# Patient Record
Sex: MALE | Race: WHITE | NOT HISPANIC OR LATINO | URBAN - METROPOLITAN AREA
[De-identification: names, ages, dates, MRNs, and addresses within clinical notes are randomized per-mention and may not be internally consistent; named-entity substitution may affect disease eponyms.]

---

## 2019-04-01 ENCOUNTER — EMERGENCY (EMERGENCY)
Facility: HOSPITAL | Age: 59
LOS: 1 days | Discharge: HOME | End: 2019-04-01
Attending: EMERGENCY MEDICINE | Admitting: EMERGENCY MEDICINE
Payer: COMMERCIAL

## 2019-04-01 VITALS
HEART RATE: 78 BPM | TEMPERATURE: 99 F | SYSTOLIC BLOOD PRESSURE: 159 MMHG | RESPIRATION RATE: 20 BRPM | DIASTOLIC BLOOD PRESSURE: 82 MMHG | WEIGHT: 199.96 LBS | OXYGEN SATURATION: 99 %

## 2019-04-01 DIAGNOSIS — Z91.048 OTHER NONMEDICINAL SUBSTANCE ALLERGY STATUS: ICD-10-CM

## 2019-04-01 DIAGNOSIS — Z79.899 OTHER LONG TERM (CURRENT) DRUG THERAPY: ICD-10-CM

## 2019-04-01 DIAGNOSIS — I82.431 ACUTE EMBOLISM AND THROMBOSIS OF RIGHT POPLITEAL VEIN: ICD-10-CM

## 2019-04-01 DIAGNOSIS — B20 HUMAN IMMUNODEFICIENCY VIRUS [HIV] DISEASE: ICD-10-CM

## 2019-04-01 PROCEDURE — 99284 EMERGENCY DEPT VISIT MOD MDM: CPT

## 2019-04-01 PROCEDURE — 93971 EXTREMITY STUDY: CPT | Mod: 26,RT

## 2019-04-01 NOTE — ED PROVIDER NOTE - CLINICAL SUMMARY MEDICAL DECISION MAKING FREE TEXT BOX
+ R popliteal DVT on Eliquis. Otherwise HDS and comfortable. Recommendation for pt to be admitted to [facility] at [facility] for IV anticoagulation and evaluation by hematology. However, pt is declining admission, stating that he feels well and has things he must do this evening. He promises to report to [facility] ED tomorrow morning for medical admission. The patient wishes to leave against medical advice.  I have discussed the risks, benefits and alternatives (including the possibility of worsening of disease, pain, permanent disability, and/or death) with the patient and his/her family (if available).  The patient voices understanding of these risks, benefits, and alternatives and still wishes to sign out against medical advice.  The patient is awake, alert, oriented  x 3 and has demonstrated capacity to refuse/direct care.  I have advised the patient that they can and should return immediately should they develop any worse/different/additional symptoms, or if they change their mind and want to continue their care.

## 2019-04-01 NOTE — ED ADULT NURSE NOTE - NSIMPLEMENTINTERV_GEN_ALL_ED
Implemented All Universal Safety Interventions:  Violet to call system. Call bell, personal items and telephone within reach. Instruct patient to call for assistance. Room bathroom lighting operational. Non-slip footwear when patient is off stretcher. Physically safe environment: no spills, clutter or unnecessary equipment. Stretcher in lowest position, wheels locked, appropriate side rails in place.

## 2019-04-01 NOTE — ED ADULT TRIAGE NOTE - CHIEF COMPLAINT QUOTE
Patient with history of DVT/PE sent by his PMD for r/o DVT due to tenderness and "bump" on his right calf; taking daily eliquis; denies any chest pain, shortness of breath, trouble breathing

## 2019-04-01 NOTE — ED PROVIDER NOTE - OBJECTIVE STATEMENT
59 y/o M w/hx HIV (compliant with treatment, undetectable), + hypercoaguable disorder w/hx unprovoked DVT/PE 2yrs ago, currently on Eliquis, returned from trip to [place] last week and for past few days has had a mild swelling to the back of his R calf w/out tenderness or erythema. Seen by PMD and referred to ED, for r/o DVT. No fever/chills/trauma. No CP/SOB/palpitations or exercise intolerance.

## 2019-04-01 NOTE — ED PROVIDER NOTE - NSFOLLOWUPINSTRUCTIONS_ED_ALL_ED_FT
Log Out.    [employer]® CareNotes®     :  [facility]             DEEP VEIN THROMBOSIS - AfterCare(R) Instructions(ER/ED)     Deep Vein Thrombosis    WHAT YOU NEED TO KNOW:    Deep vein thrombosis (DVT) is a blood clot that forms in a deep vein of the body. The DVT can break into smaller pieces and travel to your lungs and cause a blockage called a pulmonary embolism. A PE can become life-threatening. It is important to go to all follow-up appointments and to take blood thinners as directed. This is especially important if you were discharged home from the emergency department.Thrombus and Embolus         DISCHARGE INSTRUCTIONS:    Call your local emergency number (911 in the [place]) if:     You feel lightheaded, short of breath, and have chest pain.      You cough up blood.    Call your doctor if:     Your arm or leg feels warm, tender, and painful. It may look swollen and red.      You have questions or concerns about your condition or care.    Medicines:     Blood thinners help prevent blood clots. Clots can cause strokes, heart attacks, and death. The following are general safety guidelines to follow while you are taking a blood thinner:  Watch for bleeding and bruising while you take blood thinners. Watch for bleeding from your gums or nose. Watch for blood in your urine and bowel movements. Use a soft washcloth on your skin, and a soft toothbrush to brush your teeth. This can keep your skin and gums from bleeding. If you shave, use an electric shaver. Do not play contact sports.       Tell your dentist and other healthcare providers that you take a blood thinner. Wear a bracelet or necklace that says you take this medicine.       Do not start or stop any other medicines unless your healthcare provider tells you to. Many medicines cannot be used with blood thinners.      Take your blood thinner exactly as prescribed by your healthcare provider. Do not skip does or take less than prescribed. Tell your provider right away if you forget to take your blood thinner, or if you take too much.      Warfarin is a blood thinner that you may need to take. The following are things you should be aware of if you take warfarin:   Foods and medicines can affect the amount of warfarin in your blood. Do not make major changes to your diet while you take warfarin. Warfarin works best when you eat about the same amount of vitamin K every day. Vitamin K is found in green leafy vegetables and certain other foods. Ask for more information about what to eat when you are taking warfarin.      You will need to see your healthcare provider for follow-up visits when you are on warfarin. You will need regular blood tests. These tests are used to decide how much medicine you need.       Take your medicine as directed. Contact your healthcare provider if you think your medicine is not helping or if you have side effects. Tell him or her if you are allergic to any medicine. Keep a list of the medicines, vitamins, and herbs you take. Include the amounts, and when and why you take them. Bring the list or the pill bottles to follow-up visits. Carry your medicine list with you in case of an emergency.    Manage a DVT:     Wear pressure stockings as directed. The stockings put pressure on your legs. This improves blood flow and helps prevent clots. Wear the stockings during the day. Do not wear them when you sleep.Pressure Stockings            Elevate your legs above the level of your heart. Elevate your legs when you sit or lie down, as often as you can. This will help decrease swelling and pain. Prop your legs on pillows or blankets to keep them elevated comfortably. Elevate Limb         Prevent a DVT:     Exercise regularly to help increase your blood flow. Walking is a good low-impact exercise. Talk to your healthcare provider about the best exercise plan for you. Walking for Exercise           Change your body position or move around often. Move and stretch in your seat several times each hour if you travel by car or work at a desk. In an airplane, get up and walk every hour. Move your legs by tightening and releasing your leg muscles while sitting. You can move your legs while sitting by raising and lowering your heels. Keep your toes on the floor while you do this. You can also raise and lower your toes while keeping your heels on the floor.DVT Prevention Heel Raise     DVT Prevention Toe Raise           Maintain a healthy weight. Ask your healthcare provider how much you should weigh. Ask him or her to help you create a weight loss plan if you are overweight.       Do not smoke. Nicotine and other chemicals in cigarettes and cigars can damage blood vessels and make it more difficult to manage your DVT. Ask your healthcare provider for information if you currently smoke and need help to quit. E-cigarettes or smokeless tobacco still contain nicotine. Talk to your healthcare provider before you use these products.      Ask about birth control if you are a woman who takes the pill. A birth control pill increases the risk for PE in certain women. The risk is higher if you are also older than 35, smoke cigarettes, or have a blood clotting disorder. Talk to your healthcare provider about other ways to prevent pregnancy, such as a cervical cap or intrauterine device (IUD).    Follow up with your doctor or specialist as directed: You may need to come in regularly for scans to check for blood clots. Your blood may checked to see how long it takes to clot. Your doctor or specialist will tell you if you need to have this test and how often to have it. Write down your questions so you remember to ask them during your visits.

## 2019-04-02 ENCOUNTER — INPATIENT (INPATIENT)
Facility: HOSPITAL | Age: 59
LOS: 0 days | Discharge: HOME | DRG: 300 | End: 2019-04-03
Attending: INTERNAL MEDICINE | Admitting: INTERNAL MEDICINE
Payer: COMMERCIAL

## 2019-04-02 VITALS
SYSTOLIC BLOOD PRESSURE: 144 MMHG | HEART RATE: 80 BPM | OXYGEN SATURATION: 96 % | WEIGHT: 199.08 LBS | DIASTOLIC BLOOD PRESSURE: 81 MMHG | RESPIRATION RATE: 20 BRPM | TEMPERATURE: 98 F | HEIGHT: 68 IN

## 2019-04-02 DIAGNOSIS — I82.431 ACUTE EMBOLISM AND THROMBOSIS OF RIGHT POPLITEAL VEIN: ICD-10-CM

## 2019-04-02 DIAGNOSIS — I10 ESSENTIAL (PRIMARY) HYPERTENSION: ICD-10-CM

## 2019-04-02 DIAGNOSIS — Z86.711 PERSONAL HISTORY OF PULMONARY EMBOLISM: ICD-10-CM

## 2019-04-02 DIAGNOSIS — R63.8 OTHER SYMPTOMS AND SIGNS CONCERNING FOOD AND FLUID INTAKE: ICD-10-CM

## 2019-04-02 DIAGNOSIS — E78.5 HYPERLIPIDEMIA, UNSPECIFIED: ICD-10-CM

## 2019-04-02 DIAGNOSIS — Z91.89 OTHER SPECIFIED PERSONAL RISK FACTORS, NOT ELSEWHERE CLASSIFIED: ICD-10-CM

## 2019-04-02 DIAGNOSIS — Z21 ASYMPTOMATIC HUMAN IMMUNODEFICIENCY VIRUS [HIV] INFECTION STATUS: ICD-10-CM

## 2019-04-02 LAB
ALBUMIN SERPL ELPH-MCNC: 4 G/DL — SIGNIFICANT CHANGE UP (ref 3.3–5)
ALP SERPL-CCNC: 36 U/L — LOW (ref 40–120)
ALT FLD-CCNC: 21 U/L — SIGNIFICANT CHANGE UP (ref 10–45)
ANION GAP SERPL CALC-SCNC: 11 MMOL/L — SIGNIFICANT CHANGE UP (ref 5–17)
APTT BLD: 31 SEC — SIGNIFICANT CHANGE UP (ref 27.5–36.3)
APTT BLD: 66.1 SEC — HIGH (ref 27.5–36.3)
AST SERPL-CCNC: 28 U/L — SIGNIFICANT CHANGE UP (ref 10–40)
BASOPHILS # BLD AUTO: 0.05 K/UL — SIGNIFICANT CHANGE UP (ref 0–0.2)
BASOPHILS NFR BLD AUTO: 0.9 % — SIGNIFICANT CHANGE UP (ref 0–2)
BILIRUB SERPL-MCNC: 0.5 MG/DL — SIGNIFICANT CHANGE UP (ref 0.2–1.2)
BUN SERPL-MCNC: 12 MG/DL — SIGNIFICANT CHANGE UP (ref 7–23)
CALCIUM SERPL-MCNC: 8.9 MG/DL — SIGNIFICANT CHANGE UP (ref 8.4–10.5)
CHLORIDE SERPL-SCNC: 102 MMOL/L — SIGNIFICANT CHANGE UP (ref 96–108)
CO2 SERPL-SCNC: 23 MMOL/L — SIGNIFICANT CHANGE UP (ref 22–31)
CREAT SERPL-MCNC: 1.05 MG/DL — SIGNIFICANT CHANGE UP (ref 0.5–1.3)
D DIMER BLD IA.RAPID-MCNC: 436 NG/ML DDU — HIGH
EOSINOPHIL # BLD AUTO: 0.06 K/UL — SIGNIFICANT CHANGE UP (ref 0–0.5)
EOSINOPHIL NFR BLD AUTO: 1 % — SIGNIFICANT CHANGE UP (ref 0–6)
GLUCOSE SERPL-MCNC: 136 MG/DL — HIGH (ref 70–99)
HCT VFR BLD CALC: 47.7 % — SIGNIFICANT CHANGE UP (ref 39–50)
HCT VFR BLD CALC: 47.7 % — SIGNIFICANT CHANGE UP (ref 39–50)
HGB BLD-MCNC: 15.7 G/DL — SIGNIFICANT CHANGE UP (ref 13–17)
HGB BLD-MCNC: 15.8 G/DL — SIGNIFICANT CHANGE UP (ref 13–17)
IMM GRANULOCYTES NFR BLD AUTO: 0.2 % — SIGNIFICANT CHANGE UP (ref 0–1.5)
INR BLD: 1.03 — SIGNIFICANT CHANGE UP (ref 0.88–1.16)
LYMPHOCYTES # BLD AUTO: 2.34 K/UL — SIGNIFICANT CHANGE UP (ref 1–3.3)
LYMPHOCYTES # BLD AUTO: 40.7 % — SIGNIFICANT CHANGE UP (ref 13–44)
MCHC RBC-ENTMCNC: 29.2 PG — SIGNIFICANT CHANGE UP (ref 27–34)
MCHC RBC-ENTMCNC: 29.5 PG — SIGNIFICANT CHANGE UP (ref 27–34)
MCHC RBC-ENTMCNC: 32.9 GM/DL — SIGNIFICANT CHANGE UP (ref 32–36)
MCHC RBC-ENTMCNC: 33.1 GM/DL — SIGNIFICANT CHANGE UP (ref 32–36)
MCV RBC AUTO: 88.7 FL — SIGNIFICANT CHANGE UP (ref 80–100)
MCV RBC AUTO: 89 FL — SIGNIFICANT CHANGE UP (ref 80–100)
MONOCYTES # BLD AUTO: 0.65 K/UL — SIGNIFICANT CHANGE UP (ref 0–0.9)
MONOCYTES NFR BLD AUTO: 11.3 % — SIGNIFICANT CHANGE UP (ref 2–14)
NEUTROPHILS # BLD AUTO: 2.64 K/UL — SIGNIFICANT CHANGE UP (ref 1.8–7.4)
NEUTROPHILS NFR BLD AUTO: 45.9 % — SIGNIFICANT CHANGE UP (ref 43–77)
NRBC # BLD: 0 /100 WBCS — SIGNIFICANT CHANGE UP (ref 0–0)
NRBC # BLD: 0 /100 WBCS — SIGNIFICANT CHANGE UP (ref 0–0)
NT-PROBNP SERPL-SCNC: 10 PG/ML — SIGNIFICANT CHANGE UP (ref 0–300)
PLATELET # BLD AUTO: 204 K/UL — SIGNIFICANT CHANGE UP (ref 150–400)
PLATELET # BLD AUTO: 212 K/UL — SIGNIFICANT CHANGE UP (ref 150–400)
POTASSIUM SERPL-MCNC: 4.2 MMOL/L — SIGNIFICANT CHANGE UP (ref 3.5–5.3)
POTASSIUM SERPL-SCNC: 4.2 MMOL/L — SIGNIFICANT CHANGE UP (ref 3.5–5.3)
PROT SERPL-MCNC: 7.6 G/DL — SIGNIFICANT CHANGE UP (ref 6–8.3)
PROTHROM AB SERPL-ACNC: 11.7 SEC — SIGNIFICANT CHANGE UP (ref 10–12.9)
RBC # BLD: 5.36 M/UL — SIGNIFICANT CHANGE UP (ref 4.2–5.8)
RBC # BLD: 5.38 M/UL — SIGNIFICANT CHANGE UP (ref 4.2–5.8)
RBC # FLD: 15.9 % — HIGH (ref 10.3–14.5)
RBC # FLD: 15.9 % — HIGH (ref 10.3–14.5)
SODIUM SERPL-SCNC: 136 MMOL/L — SIGNIFICANT CHANGE UP (ref 135–145)
TROPONIN T SERPL-MCNC: <0.01 NG/ML — SIGNIFICANT CHANGE UP (ref 0–0.01)
WBC # BLD: 5.75 K/UL — SIGNIFICANT CHANGE UP (ref 3.8–10.5)
WBC # BLD: 6.88 K/UL — SIGNIFICANT CHANGE UP (ref 3.8–10.5)
WBC # FLD AUTO: 5.75 K/UL — SIGNIFICANT CHANGE UP (ref 3.8–10.5)
WBC # FLD AUTO: 6.88 K/UL — SIGNIFICANT CHANGE UP (ref 3.8–10.5)

## 2019-04-02 PROCEDURE — 99223 1ST HOSP IP/OBS HIGH 75: CPT | Mod: GC

## 2019-04-02 PROCEDURE — 99285 EMERGENCY DEPT VISIT HI MDM: CPT | Mod: 25

## 2019-04-02 RX ORDER — HEPARIN SODIUM 5000 [USP'U]/ML
INJECTION INTRAVENOUS; SUBCUTANEOUS
Qty: 25000 | Refills: 0 | Status: DISCONTINUED | OUTPATIENT
Start: 2019-04-02 | End: 2019-04-02

## 2019-04-02 RX ORDER — BICTEGRAVIR SODIUM, EMTRICITABINE, AND TENOFOVIR ALAFENAMIDE FUMARATE 30; 120; 15 MG/1; MG/1; MG/1
1 TABLET ORAL DAILY
Qty: 0 | Refills: 0 | Status: DISCONTINUED | OUTPATIENT
Start: 2019-04-02 | End: 2019-04-03

## 2019-04-02 RX ORDER — ATORVASTATIN CALCIUM 80 MG/1
40 TABLET, FILM COATED ORAL AT BEDTIME
Qty: 0 | Refills: 0 | Status: DISCONTINUED | OUTPATIENT
Start: 2019-04-02 | End: 2019-04-03

## 2019-04-02 RX ORDER — LOSARTAN POTASSIUM 100 MG/1
25 TABLET, FILM COATED ORAL DAILY
Qty: 0 | Refills: 0 | Status: DISCONTINUED | OUTPATIENT
Start: 2019-04-02 | End: 2019-04-03

## 2019-04-02 RX ORDER — ENOXAPARIN SODIUM 100 MG/ML
90 INJECTION SUBCUTANEOUS EVERY 12 HOURS
Qty: 0 | Refills: 0 | Status: DISCONTINUED | OUTPATIENT
Start: 2019-04-02 | End: 2019-04-03

## 2019-04-02 RX ORDER — HEPARIN SODIUM 5000 [USP'U]/ML
7000 INJECTION INTRAVENOUS; SUBCUTANEOUS ONCE
Qty: 0 | Refills: 0 | Status: COMPLETED | OUTPATIENT
Start: 2019-04-02 | End: 2019-04-02

## 2019-04-02 RX ORDER — METOPROLOL TARTRATE 50 MG
25 TABLET ORAL
Qty: 0 | Refills: 0 | Status: DISCONTINUED | OUTPATIENT
Start: 2019-04-02 | End: 2019-04-03

## 2019-04-02 RX ORDER — HEPARIN SODIUM 5000 [USP'U]/ML
7000 INJECTION INTRAVENOUS; SUBCUTANEOUS EVERY 6 HOURS
Qty: 0 | Refills: 0 | Status: DISCONTINUED | OUTPATIENT
Start: 2019-04-02 | End: 2019-04-02

## 2019-04-02 RX ORDER — HEPARIN SODIUM 5000 [USP'U]/ML
3500 INJECTION INTRAVENOUS; SUBCUTANEOUS EVERY 6 HOURS
Qty: 0 | Refills: 0 | Status: DISCONTINUED | OUTPATIENT
Start: 2019-04-02 | End: 2019-04-02

## 2019-04-02 RX ORDER — ACETAMINOPHEN 500 MG
650 TABLET ORAL EVERY 6 HOURS
Qty: 0 | Refills: 0 | Status: DISCONTINUED | OUTPATIENT
Start: 2019-04-02 | End: 2019-04-03

## 2019-04-02 RX ADMIN — HEPARIN SODIUM 1600 UNIT(S)/HR: 5000 INJECTION INTRAVENOUS; SUBCUTANEOUS at 09:46

## 2019-04-02 RX ADMIN — ATORVASTATIN CALCIUM 40 MILLIGRAM(S): 80 TABLET, FILM COATED ORAL at 22:18

## 2019-04-02 RX ADMIN — BICTEGRAVIR SODIUM, EMTRICITABINE, AND TENOFOVIR ALAFENAMIDE FUMARATE 1 TABLET(S): 30; 120; 15 TABLET ORAL at 13:11

## 2019-04-02 RX ADMIN — LOSARTAN POTASSIUM 25 MILLIGRAM(S): 100 TABLET, FILM COATED ORAL at 13:12

## 2019-04-02 RX ADMIN — ENOXAPARIN SODIUM 90 MILLIGRAM(S): 100 INJECTION SUBCUTANEOUS at 23:33

## 2019-04-02 RX ADMIN — HEPARIN SODIUM 7000 UNIT(S): 5000 INJECTION INTRAVENOUS; SUBCUTANEOUS at 09:48

## 2019-04-02 NOTE — H&P ADULT - NSHPPHYSICALEXAM_GEN_ALL_CORE
VITAL SIGNS:  T(C): 37 (04-02-19 @ 09:01), Max: 37.1 (04-01-19 @ 17:21)  T(F): 98.6 (04-02-19 @ 09:01), Max: 98.8 (04-01-19 @ 17:21)  HR: 65 (04-02-19 @ 09:01) (65 - 80)  BP: 153/75 (04-02-19 @ 09:01) (144/81 - 159/82)  BP(mean): --  RR: 18 (04-02-19 @ 09:01) (18 - 20)  SpO2: 99% (04-02-19 @ 09:01) (96% - 99%)  Wt(kg): --    PHYSICAL EXAM:  Constitutional: WDWN resting comfortably in bed; NAD  Head: NC/AT  Eyes: PERRL, EOMI, anicteric sclera  ENT: no nasal discharge; uvula midline, no oropharyngeal erythema or exudates; MMM  Neck: supple; no JVD or thyromegaly  Respiratory: CTA B/L; no W/R/R, no retractions  Cardiac: +S1/S2; RRR; no M/R/G; PMI non-displaced  Gastrointestinal: soft, NT/ND; no rebound or guarding; +BSx4  Back: spine midline, no bony tenderness or step-offs; no CVAT B/L  Extremities: WWP, no clubbing or cyanosis; R lower leg with nonpitting edema, larger than left, no erythema or warmth, slight tenderness under the R knee, otherwise nontender. L leg normal. DP/PT pulses normal bilaterally.   Musculoskeletal: NROM x4; no joint swelling, tenderness or erythema  Dermatologic: skin warm, dry and intact; no rashes, wounds, or scars  Neurologic: AAOx3; CNII-XII grossly intact; no focal deficits  Psychiatric: affect and characteristics of appearance, verbalizations, behaviors are appropriate

## 2019-04-02 NOTE — ED PROVIDER NOTE - ATTENDING CONTRIBUTION TO CARE
Pt is  a 59yo m, h/o hiv on haart (vl ud), dvt/pe on eliquis, who p/w new dvt to rle. Pt returned from [place] 1 wk ago and noted pain and swelling to r calf. Seen at [facility] last night and found to have dvt to r greater saph/ popliteal region. No cp, sob.     VITAL SIGNS: I have reviewed nursing notes and confirm.  CONSTITUTIONAL: Well-developed; well-nourished; in no acute distress.   SKIN:  warm and dry, no acute rash.   HEAD:  normocephalic, atraumatic.  EYES: PERRL, EOM intact; conjunctiva and sclera clear.  ENT: No nasal discharge; airway clear.   NECK: Supple; non tender.  CARD: S1, S2 normal; no murmurs, gallops, or rubs. Regular rate and rhythm.   RESP:  Clear to auscultation b/l, no wheezes, rales or rhonchi.  ABD: Normal bowel sounds; soft; non-distended; non-tender; no guarding/ rebound.  EXT: Normal ROM. R calf: + increased girth and swelling. Distal pulses intact. Compartments soft.   NEURO: Alert, oriented, motor/ sensation intact.  PSYCH: Cooperative, mood and affect appropriate.    Labs pending. Will start on heparin and admit for recurrent dvt despite being on ac. Pt is  a 59yo m, h/o hiv on haart (vl ud), dvt/pe on eliquis, hypercoagulable d/o, who p/w new dvt to rle. Pt returned from [place] 1 wk ago and noted pain and swelling to r calf. Seen at [facility] last night and found to have dvt to r greater saph/ popliteal region. No cp, sob, palp, dizziness, syncope...    VITAL SIGNS: I have reviewed nursing notes and confirm.  CONSTITUTIONAL: Well-developed; well-nourished; in no acute distress.   SKIN:  warm and dry, no acute rash.   HEAD:  normocephalic, atraumatic.  EYES: PERRL, EOM intact; conjunctiva and sclera clear.  ENT: No nasal discharge; airway clear.   NECK: Supple; non tender.  CARD: S1, S2 normal; no murmurs, gallops, or rubs. Regular rate and rhythm.   RESP:  Clear to auscultation b/l, no wheezes, rales or rhonchi.  ABD: Normal bowel sounds; soft; non-distended; non-tender; no guarding/ rebound.  EXT: Normal ROM. R calf: + mildly increased girth and swelling. Distal pulses intact. Compartments soft.   NEURO: Alert, oriented, motor/ sensation intact.  PSYCH: Cooperative, mood and affect appropriate.    Labs pending. Will start on heparin and admit for recurrent dvt despite being on ac.

## 2019-04-02 NOTE — H&P ADULT - ASSESSMENT
Patient is a 59 y/o male w hx of HIV (VLUD, well controlled on ARVs), DVT/PE 2 years ago (on Eliquis), prothrombin gene mutation, HTN, HLD, TAA presenting for evaluation of R calf pain.

## 2019-04-02 NOTE — H&P ADULT - ATTENDING COMMENTS
Pt seen and examined by me with housestaff earlier. Agree with housestaff's exam/a/p as noted above with additions,   VSS, exam as above  labs reviewed   a/p;  1. Recurrent DVT despite compliant with eliquis-appreciate heme recs, on heparin gtt  2. HIV-c/w home meds  rest of treatment plan as above.

## 2019-04-02 NOTE — ED PROVIDER NOTE - PHYSICAL EXAMINATION
CONSTITUTIONAL: Well-appearing; well-nourished; in no apparent distress.   HEAD: Normocephalic; atraumatic.   EYES: PERRL; EOM intact; conjunctiva and sclera clear  ENT: normal nose; no rhinorrhea; normal pharynx with no erythema or lesions.   NECK: Supple; non-tender; no LAD  CARDIOVASCULAR: Normal S1, S2; no murmurs, rubs, or gallops. Regular rate and rhythm.   RESPIRATORY: Breathing easily; breath sounds clear and equal bilaterally; no wheezes, rhonchi, or rales.  GI: Soft; non-distended; non-tender; no palpable organomegaly.   MSK: FROM at all extremities, normal tone   EXT: No cyanosis or edema; N/V intact  SKIN: Normal for age and race; warm; dry; good turgor; no apparent lesions or rash.   NEURO: A & O x 3; face symmetric; grossly unremarkable.   PSYCHOLOGICAL: The patient’s mood and manner are appropriate. CONSTITUTIONAL: Well-appearing; well-nourished; in no apparent distress.   HEAD: Normocephalic; atraumatic.   EYES: PERRL; EOM intact; conjunctiva and sclera clear  ENT: normal nose; no rhinorrhea; normal pharynx with no erythema or lesions.   NECK: Supple; non-tender; no LAD  CARDIOVASCULAR: Normal S1, S2; no murmurs, rubs, or gallops. Regular rate and rhythm.   RESPIRATORY: Breathing easily; breath sounds clear and equal bilaterally; no wheezes, rhonchi, or rales.  GI: Soft; non-distended; non-tender; no palpable organomegaly.   MSK: FROM at all extremities, normal tone   EXT: No cyanosis; mild R calf swelling with tenderness, NV intact   SKIN: Normal for age and race; warm; dry; good turgor; no apparent lesions or rash.   NEURO: A & O x 3; face symmetric; grossly unremarkable.   PSYCHOLOGICAL: The patient’s mood and manner are appropriate.

## 2019-04-02 NOTE — H&P ADULT - NSHPLABSRESULTS_GEN_ALL_CORE
LABS:                        15.8   5.75  )-----------( 204      ( 02 Apr 2019 09:02 )             47.7     04-02    136  |  102  |  12  ----------------------------<  136<H>  4.2   |  23  |  1.05    Ca    8.9      02 Apr 2019 09:02    TPro  7.6  /  Alb  4.0  /  TBili  0.5  /  DBili  x   /  AST  28  /  ALT  21  /  AlkPhos  36<L>  04-02    PT/INR - ( 02 Apr 2019 09:02 )   PT: 11.7 sec;   INR: 1.03          PTT - ( 02 Apr 2019 09:02 )  PTT:31.0 sec    CAPILLARY BLOOD GLUCOSE          RADIOLOGY & ADDITIONAL TESTS: Reviewed.]  US LE with popliteal and proximal greater saphenous nonocclusive thrombus

## 2019-04-02 NOTE — H&P ADULT - NSICDXPASTMEDICALHX_GEN_ALL_CORE_FT
PAST MEDICAL HISTORY:  DVT (deep venous thrombosis)     HIV (human immunodeficiency virus infection)     Pulmonary emboli

## 2019-04-02 NOTE — H&P ADULT - PROBLEM SELECTOR PLAN 3
Known HIV infection, compliant on meds, long history of undetectable VL, follows up with an HIV clinic near his home in [place]  - Continue home Biktarvy and Kaletra

## 2019-04-02 NOTE — H&P ADULT - PROBLEM SELECTOR PLAN 2
Wells score is 4.5 given previous DVT/PE and confirmed DVT, however patient already on heparin drip and has no pulmonary symptoms or tachycardia, no signs of hemodynamic instability  - CTPE unlikely to  at this time, will obtain if patient develops pulmonary symptoms or tachycardia

## 2019-04-02 NOTE — H&P ADULT - NSHPREVIEWOFSYSTEMS_GEN_ALL_CORE
REVIEW OF SYSTEMS:  CONSTITUTIONAL: No weakness, fevers or chills.   EYES/ENT: No visual changes;  No vertigo or throat pain   NECK: No pain or stiffness  RESPIRATORY: No cough, wheezing, hemoptysis; No shortness of breath  CARDIOVASCULAR: No chest pain or palpitations. R calf pain and swelling.   GASTROINTESTINAL: No abdominal or epigastric pain. No nausea, vomiting, or hematemesis; No diarrhea or constipation. No melena or hematochezia.  GENITOURINARY: No dysuria, frequency or hematuria  NEUROLOGICAL: No numbness or weakness  SKIN: No itching, burning, rashes, or lesions   All other review of systems is negative unless indicated above.

## 2019-04-02 NOTE — H&P ADULT - PROBLEM SELECTOR PLAN 1
Patient with new nonocclusive R proximal greater saphenous and R popliteal DVT seen on US doppler. Hx of clotting disorder, on Eliquis and compliant, symptoms began prior to flight, therefore unprovoked DVT, failed Eliquis.  - Started heparin drip, patient was bolused in ED, PTT q6h and will continue to adjust dosage  - Patient has never been on anticoagulants other than Eliquis, last and only episode of DVT/PE was 2 years ago Patient with new nonocclusive R proximal greater saphenous and R popliteal DVT seen on US doppler. Hx of clotting disorder, on Eliquis 2.5 bid and compliant, symptoms began prior to flight, therefore unprovoked DVT  - Eliquis 2.5 bid dosing is likely d/t interaction with Kaletra which rec's 50% dose reduction. Consulted heme/onc for recommendations on alternative dosing/medication given treatment failure  - Started heparin drip, patient was bolused in ED, PTT q6h and will continue to adjust dosage  - Patient has never been on anticoagulants other than Eliquis, last and only episode of DVT/PE was 2 years ago

## 2019-04-02 NOTE — H&P ADULT - PROBLEM SELECTOR PLAN 7
1.       PCP Contacted on Admission: (Y/N) --> Name & Phone #: Duglas Mane (LEON) at [facility]  2.       Date of Contact with PCP: 4/2/19  3.       PCP Contacted at Discharge: (Y/N)  4.       Summary of Handoff Given to PCP:  5.       Post-Discharge Appointment Date and Location: [facility]

## 2019-04-02 NOTE — CONSULT NOTE ADULT - ASSESSMENT
Patient is a 57 y/o male w hx of HIV (VLUD, well controlled on ARVs), DVT/PE 2 years ago (on Eliquis), prothrombin gene mutation, HTN, HLD, TAA presenting for evaluation of R calf pain.     #R DVT   #Prothrombin Gene mutation     -c/w hep gtt, no clinical cardiopulm signs for PE  -appears to have recurrent/acute unprovoked thrombosis  -previous Apixaban dosing 2.5 mg BID (due to interaction with HAART), will consider alternative NOAC vs. Lovenox (no signs of organ dysfunct)   -lifetime risk of recurrent thrombosis remains elevated   -to limit high risk states including obesity, immobility smoking       To be discussed w/ Dr. Zeng Patient is a 59 y/o male w hx of HIV dx 1989, MSM (VLUD, well controlled on ARVs), DVT/PE 2 years ago (on Eliquis), prothrombin gene mutation, HTN, HLD, TAA presenting for evaluation of R calf pain, with long hx of frequent air travel.     #R DVT, Recurrent   #Prothrombin Gene mutation (hetero/homozygous?)    -c/w hep gtt, no clinical cardiopulm signs for PE  -appears to have recurrent/acute unprovoked thrombosis  -pt admits to compliance with medications   -previous Apixaban dosing 2.5 mg BID (due to interaction with HAART), will consider alternative NOAC vs. Lovenox (no signs of organ dysfunct)   -lifetime risk of recurrent thrombosis remains elevated   -to limit high risk states including obesity, immobility smoking       To be discussed w/ Dr. Zeng Patient is a 57 y/o male w hx of HIV dx 1990, MSM (VLUD, well controlled on ARVs), DVT/PE 2 years ago (on Eliquis), prothrombin gene mutation, HTN, HLD, TAA presenting for evaluation of R calf pain, with long hx of frequent air travel.     #R DVT, Recurrent, Superficial Thrombophlebitis   #Prothrombin Gene mutation (hetero/homozygous?)    -transition to Lovenox 90 mg BID, monitor for bleeding  -Please send Prothrombin gene mutation, Anticardiolipin, and B2 glycoprotein testing   -please send D-dimer  (acute vs. chronic?), will discuss w/ radiology   -ideally would have sent for factor Xa level to core lab for drug level monitoring, however outside the window   -pt admits to compliance with medications, will re-address  -please discuss w/ ID if another ARV can be substituted to allow for another NOAC or Oral agent  -lifetime risk of recurrent thrombosis remains elevated   -to limit high risk states including obesity, immobility smoking       Discussed w/ Dr. Zeng

## 2019-04-02 NOTE — H&P ADULT - HISTORY OF PRESENT ILLNESS
Patient is a 59 y/o male w hx of HIV (VLUD, well controlled on ARVs), DVT/PE 2 years ago (on Eliquis), prothrombin gene mutation, HTN, HLD, TAA presenting for evaluation of R calf pain. Patient states that 1 week ago he was working out when he began to notice pain and swelling in his left calf. 5 days ago he took a flight from [place] to [place]. He continued to have worsening pain and swelling and was sent to the ED by his primary for an ultrasound. In the ED patient was found to have nonocclusive thrombus of the R popliteal and R proximal greater saphenous. Patient has been taking Eliquis regularly for the past two years and has not had any blood clots since then. He denies any shortness of breath, cough/hemoptysis, orthopnea, dizziness, lightheadedness, fevers, palpitations, chest pain, or abdominal pain. In the ED patient received a heparin bolus and was started on heparin drip. Patient is a 57 y/o male w hx of HIV (VLUD, well controlled on ARVs), DVT/PE 2 years ago (on Eliquis), prothrombin gene mutation, HTN, HLD, TAA presenting for evaluation of R calf pain. Patient states that 1 week ago he was working out when he began to notice pain and swelling in his left calf. 5 days ago he took a flight from [place] to [place]. He continued to have worsening pain and swelling and was sent to the ED by his primary for an ultrasound. In the ED patient was found to have nonocclusive thrombus of the R popliteal and R proximal greater saphenous. Patient has been taking Eliquis 2.5 bid regularly for the past two years and has not had any blood clots since then. He denies any shortness of breath, cough/hemoptysis, orthopnea, dizziness, lightheadedness, fevers, palpitations, chest pain, or abdominal pain. In the ED patient received a heparin bolus and was started on heparin drip.

## 2019-04-02 NOTE — CONSULT NOTE ADULT - SUBJECTIVE AND OBJECTIVE BOX
Hematology Oncology Consult Note (Dr. Zeng )    Patient is a 59 y/o male w hx of HIV (VLUD, well controlled on ARVs), DVT/PE 2 years ago (on Eliquis), prothrombin gene mutation, HTN, HLD, TAA presenting for evaluation of R calf pain. Patient states that 1 week ago he was working out when he began to notice pain and swelling in his left calf. 5 days ago he took a flight from [place] to [place]. He continued to have worsening pain and swelling and was sent to the ED by his primary for an ultrasound. In the ED patient was found to have nonocclusive thrombus of the R popliteal and R proximal greater saphenous. Patient has been taking Eliquis regularly for the past two years and has not had any blood clots since then. He denies any shortness of breath, cough/hemoptysis, orthopnea, dizziness, lightheadedness, fevers, palpitations, chest pain, or abdominal pain. In the ED patient received a heparin bolus and was started on heparin drip.       PAST MEDICAL & SURGICAL HISTORY:  Pulmonary emboli  DVT (deep venous thrombosis)  HIV (human immunodeficiency virus infection)  No significant past surgical history      Allergies:  NKDA         Medications:  heparin  Infusion.  Unit(s)/Hr IV Continuous <Continuous>  heparin  Injectable 7000 Unit(s) IV Push every 6 hours PRN  heparin  Injectable 3500 Unit(s) IV Push every 6 hours PRN  MEDICATIONS  (STANDING):  atorvastatin 40 milliGRAM(s) Oral at bedtime  bictegravir 50 mG/emtricitabine 200 mG/tenofovir alafenamide 25 mG (BIKTARVY) 1 Tablet(s) Oral daily  heparin  Infusion.  Unit(s)/Hr (16 mL/Hr) IV Continuous <Continuous>  lopinavir 200 mG/ritonavir 50 mG 2 Tablet(s) Oral two times a day  losartan 25 milliGRAM(s) Oral daily  metoprolol succinate ER 25 milliGRAM(s) Oral two times a day    MEDICATIONS  (PRN):  acetaminophen   Tablet .. 650 milliGRAM(s) Oral every 6 hours PRN Mild Pain (1 - 3)  heparin  Injectable 7000 Unit(s) IV Push every 6 hours PRN For aPTT less than 40  heparin  Injectable 3500 Unit(s) IV Push every 6 hours PRN For aPTT between 40 - 57        Social History:   Tobacco: Denies  	EtOH: 2 drinks 4x week  Drugs: Denies    FAMILY HISTORY:  Family history of CVA  FH: myocardial infarction      PHYSICAL EXAM:    T(F): 98.6 (04-02-19 @ 09:01), Max: 98.8 (04-01-19 @ 17:21)  HR: 65 (04-02-19 @ 09:01) (65 - 80)  BP: 153/75 (04-02-19 @ 09:01) (144/81 - 159/82)  RR: 18 (04-02-19 @ 09:01) (18 - 20)  SpO2: 99% (04-02-19 @ 09:01) (96% - 99%)  Wt(kg): --    Daily Height in cm: 172.72 (02 Apr 2019 07:10)    Daily     GEN: NAD  HEENT: AT/NC, EOMI, MMM  NECK: supple   CVS: +S1S2 RRR   LUNG: CTA B/L   GI: +BS, NT ND  EXT: R > L w/ slight tenderness under knee   NEURO: aaox3             Labs:                          15.8   5.75  )-----------( 204      ( 02 Apr 2019 09:02 )             47.7     CBC Full  -  ( 02 Apr 2019 09:02 )  WBC Count : 5.75 K/uL  RBC Count : 5.36 M/uL  Hemoglobin : 15.8 g/dL  Hematocrit : 47.7 %  Platelet Count - Automated : 204 K/uL  Mean Cell Volume : 89.0 fl  Mean Cell Hemoglobin : 29.5 pg  Mean Cell Hemoglobin Concentration : 33.1 gm/dL  Auto Neutrophil # : 2.64 K/uL  Auto Lymphocyte # : 2.34 K/uL  Auto Monocyte # : 0.65 K/uL  Auto Eosinophil # : 0.06 K/uL  Auto Basophil # : 0.05 K/uL  Auto Neutrophil % : 45.9 %  Auto Lymphocyte % : 40.7 %  Auto Monocyte % : 11.3 %  Auto Eosinophil % : 1.0 %  Auto Basophil % : 0.9 %    PT/INR - ( 02 Apr 2019 09:02 )   PT: 11.7 sec;   INR: 1.03          PTT - ( 02 Apr 2019 09:02 )  PTT:31.0 sec    04-02    136  |  102  |  12  ----------------------------<  136<H>  4.2   |  23  |  1.05    Ca    8.9      02 Apr 2019 09:02    TPro  7.6  /  Alb  4.0  /  TBili  0.5  /  DBili  x   /  AST  28  /  ALT  21  /  AlkPhos  36<L>  04-02      Ultrasound 4/2/19    IMPRESSION:    Nonocclusive thrombus in the right popliteal vein.  Nonocclusive thrombus in the proximal right greater saphenous vein. Hematology Oncology Consult Note (Dr. Zeng )    Patient is a 59 y/o male w hx of HIV ( Dx 1989 MSM, no opportunistic infect, VLUD, well controlled on ARVs), DVT/PE 2 years ago (on Eliquis), prothrombin gene mutation (unclear if hetero/homozygous), HTN, HLD, TAA presenting for evaluation of R calf pain. Patient states that 1 week ago he was working out when he began to notice pain and swelling in his R calf. 5 days ago he took a flight from [place] to [place]. Patient travels frequently every 2 weeks [place] to [place]. He continued to have worsening pain and swelling and was sent to the ED by his primary for an ultrasound. In the ED patient was found to have nonocclusive thrombus of the R popliteal and R proximal greater saphenous. Patient has been taking Eliquis regularly for the past two years and has not had any blood clots since then. He denies any shortness of breath, cough/hemoptysis, orthopnea, dizziness, lightheadedness, fevers, palpitations, chest pain, or abdominal pain. In the ED patient received a heparin bolus and was started on heparin drip.       PAST MEDICAL & SURGICAL HISTORY:  Pulmonary emboli  DVT (deep venous thrombosis)  HIV (human immunodeficiency virus infection)  No significant past surgical history      Allergies:  NKDA         Medications:  heparin  Infusion.  Unit(s)/Hr IV Continuous <Continuous>  heparin  Injectable 7000 Unit(s) IV Push every 6 hours PRN  heparin  Injectable 3500 Unit(s) IV Push every 6 hours PRN  MEDICATIONS  (STANDING):  atorvastatin 40 milliGRAM(s) Oral at bedtime  bictegravir 50 mG/emtricitabine 200 mG/tenofovir alafenamide 25 mG (BIKTARVY) 1 Tablet(s) Oral daily  heparin  Infusion.  Unit(s)/Hr (16 mL/Hr) IV Continuous <Continuous>  lopinavir 200 mG/ritonavir 50 mG 2 Tablet(s) Oral two times a day  losartan 25 milliGRAM(s) Oral daily  metoprolol succinate ER 25 milliGRAM(s) Oral two times a day    MEDICATIONS  (PRN):  acetaminophen   Tablet .. 650 milliGRAM(s) Oral every 6 hours PRN Mild Pain (1 - 3)  heparin  Injectable 7000 Unit(s) IV Push every 6 hours PRN For aPTT less than 40  heparin  Injectable 3500 Unit(s) IV Push every 6 hours PRN For aPTT between 40 - 57        Social History:   Tobacco: Denies  	EtOH: 2 drinks 4x week  Drugs: Denies    FAMILY HISTORY:  Family history of CVA  FH: myocardial infarction      PHYSICAL EXAM:    T(F): 98.6 (04-02-19 @ 09:01), Max: 98.8 (04-01-19 @ 17:21)  HR: 65 (04-02-19 @ 09:01) (65 - 80)  BP: 153/75 (04-02-19 @ 09:01) (144/81 - 159/82)  RR: 18 (04-02-19 @ 09:01) (18 - 20)  SpO2: 99% (04-02-19 @ 09:01) (96% - 99%)  Wt(kg): --    Daily Height in cm: 172.72 (02 Apr 2019 07:10)    Daily     GEN: NAD  HEENT: AT/NC, EOMI, MMM  NECK: supple   CVS: +S1S2 RRR   LUNG: CTA B/L   GI: +BS, NT ND  EXT: R > L w/ slight tenderness under knee   NEURO: aaox3       Labs:                          15.8   5.75  )-----------( 204      ( 02 Apr 2019 09:02 )             47.7     CBC Full  -  ( 02 Apr 2019 09:02 )  WBC Count : 5.75 K/uL  RBC Count : 5.36 M/uL  Hemoglobin : 15.8 g/dL  Hematocrit : 47.7 %  Platelet Count - Automated : 204 K/uL  Mean Cell Volume : 89.0 fl  Mean Cell Hemoglobin : 29.5 pg  Mean Cell Hemoglobin Concentration : 33.1 gm/dL  Auto Neutrophil # : 2.64 K/uL  Auto Lymphocyte # : 2.34 K/uL  Auto Monocyte # : 0.65 K/uL  Auto Eosinophil # : 0.06 K/uL  Auto Basophil # : 0.05 K/uL  Auto Neutrophil % : 45.9 %  Auto Lymphocyte % : 40.7 %  Auto Monocyte % : 11.3 %  Auto Eosinophil % : 1.0 %  Auto Basophil % : 0.9 %    PT/INR - ( 02 Apr 2019 09:02 )   PT: 11.7 sec;   INR: 1.03          PTT - ( 02 Apr 2019 09:02 )  PTT:31.0 sec    04-02    136  |  102  |  12  ----------------------------<  136<H>  4.2   |  23  |  1.05    Ca    8.9      02 Apr 2019 09:02    TPro  7.6  /  Alb  4.0  /  TBili  0.5  /  DBili  x   /  AST  28  /  ALT  21  /  AlkPhos  36<L>  04-02      Ultrasound 4/2/19    IMPRESSION:    Nonocclusive thrombus in the right popliteal vein.  Nonocclusive thrombus in the proximal right greater saphenous vein.

## 2019-04-02 NOTE — ED ADULT TRIAGE NOTE - ARRIVAL INFO ADDITIONAL COMMENTS
Pt c/o RLE pain. Pt seen at [facility] and dx with RL DVT. Pt was to be admitted but he went home first.

## 2019-04-02 NOTE — ED ADULT NURSE NOTE - NSIMPLEMENTINTERV_GEN_ALL_ED
Implemented All Universal Safety Interventions:  [place] to call system. Call bell, personal items and telephone within reach. Instruct patient to call for assistance. Room bathroom lighting operational. Non-slip footwear when patient is off stretcher. Physically safe environment: no spills, clutter or unnecessary equipment. Stretcher in lowest position, wheels locked, appropriate side rails in place.

## 2019-04-02 NOTE — ED ADULT NURSE NOTE - EXTENSIONS OF SELF_ADULT
[facility] Emergency [facility]  [address]   [place] [address]  Phone: [phone]    Name:  Anshul Paniagua  Current Date: 2017  : 1949  MRN: [id number]   YRN: [id number]    Visit Date: 2017  Address: [place] [address]  Phone: [phone]    Primary Care Provider     Name: Outside Provider    Phone: None    The staff of [facility] would like to thank you for allowing us to assist you with your healthcare needs. The following includes patient education materials and information on how best to care for your illness/injury at home and when to see a physician. If you need to locate a Doctor or clinic close to you, please call the Doctor Referral Service at [phone]. The Service is available Monday through Thursday from 8 AM to 8 PM and  from 8 AM to 4 PM.    Patients Please Note: If further time off is required, or a medical clearance to return to work is required, it must be obtained through your primary physician.  Return to work clearances and extensions of \"Time-Off\" will not be given by the Emergency Department.     We hope that you leave our Emergency Department believing that we provided you with very good care.   Your Opinion Matters To Us  If you receive a patient satisfaction survey in the mail, please complete and return it in the postage-paid envelope.  We truly value and appreciate your feedback.  Emergency Department Care Providers   Physician:Bennie Locke MD    Advanced Practice Provider:  No providers found     RN_________________ ED Tech__________________ Clerical_________________         None

## 2019-04-02 NOTE — ED PROVIDER NOTE - OBJECTIVE STATEMENT
59 y/o M w/hx HIV (compliant with treatment, undetectable), + hypercoaguable disorder w/hx unprovoked DVT/PE 2yrs ago, currently on Eliquis, returned from trip to [place] last week and for past few days has had a mild swelling to the back of his R calf w/out tenderness or erythema. Seen by PMD and referred to ED, for r/o DVT. No fever/chills/trauma. No CP/SOB/palpitations or exercise intolerance. Pt seen at [facility] yesterday and diagnosed with a popliteal DVT. Pt recommended for admission for heparin but pt needed to go home and take care of some things first. 57 y/o M with pmh HIV (compliant with treatment, undetectable), + hypercoaguable disorder w/hx unprovoked DVT/PE 2yrs ago, currently on Eliquis, returned from trip to [place] last week and for past few days has had a mild swelling to the back of his R calf. Initially felt a hard lump and then a few days later it started to swell and have pain. Seen by PMD and referred to ED, for r/o DVT. No fever/chills/trauma. No CP/SOB/palpitations or exercise intolerance. Pt seen at [facility] yesterday and diagnosed with a popliteal DVT and greater saphenous. Pt recommended for admission for heparin but pt needed to go home and take care of some things first.

## 2019-04-02 NOTE — ED PROVIDER NOTE - CLINICAL SUMMARY MEDICAL DECISION MAKING FREE TEXT BOX
57 y/o M with pmh HIV (compliant with treatment, undetectable), + hypercoaguable disorder w/hx unprovoked DVT/PE 2yrs ago, currently on Eliquis, returned from trip to [place] last week and for past few days has had a mild swelling to the back of his R calf. Diagnosed with R popliteal and greater saphenous DVT yesterday. No cp, sob. Here for admission for heparin.

## 2019-04-02 NOTE — ED ADULT NURSE NOTE - CHPI ED NUR SYMPTOMS NEG
no nausea/no dizziness/no tingling/no vomiting/no weakness/no pain/no decreased eating/drinking/no chills/no fever

## 2019-04-03 ENCOUNTER — TRANSCRIPTION ENCOUNTER (OUTPATIENT)
Age: 59
End: 2019-04-03

## 2019-04-03 VITALS
SYSTOLIC BLOOD PRESSURE: 145 MMHG | TEMPERATURE: 98 F | HEART RATE: 67 BPM | DIASTOLIC BLOOD PRESSURE: 79 MMHG | RESPIRATION RATE: 18 BRPM | OXYGEN SATURATION: 96 %

## 2019-04-03 LAB
ANION GAP SERPL CALC-SCNC: 9 MMOL/L — SIGNIFICANT CHANGE UP (ref 5–17)
APTT BLD: 58.6 SEC — HIGH (ref 27.5–36.3)
BUN SERPL-MCNC: 13 MG/DL — SIGNIFICANT CHANGE UP (ref 7–23)
CALCIUM SERPL-MCNC: 8.8 MG/DL — SIGNIFICANT CHANGE UP (ref 8.4–10.5)
CHLORIDE SERPL-SCNC: 100 MMOL/L — SIGNIFICANT CHANGE UP (ref 96–108)
CO2 SERPL-SCNC: 25 MMOL/L — SIGNIFICANT CHANGE UP (ref 22–31)
CREAT SERPL-MCNC: 1.23 MG/DL — SIGNIFICANT CHANGE UP (ref 0.5–1.3)
GLUCOSE SERPL-MCNC: 98 MG/DL — SIGNIFICANT CHANGE UP (ref 70–99)
HCT VFR BLD CALC: 47.5 % — SIGNIFICANT CHANGE UP (ref 39–50)
HGB BLD-MCNC: 15.7 G/DL — SIGNIFICANT CHANGE UP (ref 13–17)
MAGNESIUM SERPL-MCNC: 1.9 MG/DL — SIGNIFICANT CHANGE UP (ref 1.6–2.6)
MCHC RBC-ENTMCNC: 29.3 PG — SIGNIFICANT CHANGE UP (ref 27–34)
MCHC RBC-ENTMCNC: 33.1 GM/DL — SIGNIFICANT CHANGE UP (ref 32–36)
MCV RBC AUTO: 88.6 FL — SIGNIFICANT CHANGE UP (ref 80–100)
NRBC # BLD: 0 /100 WBCS — SIGNIFICANT CHANGE UP (ref 0–0)
PLATELET # BLD AUTO: 199 K/UL — SIGNIFICANT CHANGE UP (ref 150–400)
POTASSIUM SERPL-MCNC: 4.4 MMOL/L — SIGNIFICANT CHANGE UP (ref 3.5–5.3)
POTASSIUM SERPL-SCNC: 4.4 MMOL/L — SIGNIFICANT CHANGE UP (ref 3.5–5.3)
RBC # BLD: 5.36 M/UL — SIGNIFICANT CHANGE UP (ref 4.2–5.8)
RBC # FLD: 15.5 % — HIGH (ref 10.3–14.5)
SODIUM SERPL-SCNC: 134 MMOL/L — LOW (ref 135–145)
WBC # BLD: 5.99 K/UL — SIGNIFICANT CHANGE UP (ref 3.8–10.5)
WBC # FLD AUTO: 5.99 K/UL — SIGNIFICANT CHANGE UP (ref 3.8–10.5)

## 2019-04-03 PROCEDURE — 85025 COMPLETE CBC W/AUTO DIFF WBC: CPT

## 2019-04-03 PROCEDURE — 83735 ASSAY OF MAGNESIUM: CPT

## 2019-04-03 PROCEDURE — 84484 ASSAY OF TROPONIN QUANT: CPT

## 2019-04-03 PROCEDURE — 85027 COMPLETE CBC AUTOMATED: CPT

## 2019-04-03 PROCEDURE — 85610 PROTHROMBIN TIME: CPT

## 2019-04-03 PROCEDURE — 80048 BASIC METABOLIC PNL TOTAL CA: CPT

## 2019-04-03 PROCEDURE — 99285 EMERGENCY DEPT VISIT HI MDM: CPT | Mod: 25

## 2019-04-03 PROCEDURE — 85730 THROMBOPLASTIN TIME PARTIAL: CPT

## 2019-04-03 PROCEDURE — 86146 BETA-2 GLYCOPROTEIN ANTIBODY: CPT

## 2019-04-03 PROCEDURE — 96374 THER/PROPH/DIAG INJ IV PUSH: CPT

## 2019-04-03 PROCEDURE — 83880 ASSAY OF NATRIURETIC PEPTIDE: CPT

## 2019-04-03 PROCEDURE — 80053 COMPREHEN METABOLIC PANEL: CPT

## 2019-04-03 PROCEDURE — 85379 FIBRIN DEGRADATION QUANT: CPT

## 2019-04-03 PROCEDURE — 99239 HOSP IP/OBS DSCHRG MGMT >30: CPT

## 2019-04-03 PROCEDURE — 36415 COLL VENOUS BLD VENIPUNCTURE: CPT

## 2019-04-03 RX ORDER — ENOXAPARIN SODIUM 100 MG/ML
135 INJECTION SUBCUTANEOUS
Qty: 4500 | Refills: 0
Start: 2019-04-03 | End: 2019-05-02

## 2019-04-03 RX ADMIN — ENOXAPARIN SODIUM 90 MILLIGRAM(S): 100 INJECTION SUBCUTANEOUS at 11:04

## 2019-04-03 RX ADMIN — Medication 2 TABLET(S): at 05:26

## 2019-04-03 RX ADMIN — BICTEGRAVIR SODIUM, EMTRICITABINE, AND TENOFOVIR ALAFENAMIDE FUMARATE 1 TABLET(S): 30; 120; 15 TABLET ORAL at 11:04

## 2019-04-03 RX ADMIN — LOSARTAN POTASSIUM 25 MILLIGRAM(S): 100 TABLET, FILM COATED ORAL at 05:26

## 2019-04-03 RX ADMIN — Medication 25 MILLIGRAM(S): at 05:26

## 2019-04-03 NOTE — DISCHARGE NOTE NURSING/CASE MANAGEMENT/SOCIAL WORK - NSDCDPATPORTLINK_GEN_ALL_CORE
You can access the [facility] Patient Portal, offered by [facility], by registering with the following website: [email or web address]

## 2019-04-03 NOTE — DISCHARGE NOTE PROVIDER - NSDCCPCAREPLAN_GEN_ALL_CORE_FT
PRINCIPAL DISCHARGE DIAGNOSIS  Diagnosis: Acute deep vein thrombosis (DVT) of popliteal vein of right lower extremity  Assessment and Plan of Treatment: You were found to have an acute DVT which is a clot of the veins of the leg. This is likely due to your history of prothrombin gene mutation for which you have been taking Eliquis. However, you are on a decreased dose of Eliquis due to interaction with your Kaletra. For this reason, we put you on Lovenox, a blood thinner that does not have this interaction. This is a once daily injection and you were instructed on how to use the injectible. A 30 day supply was sent to your pharmacy. We contacted your HIV specialist and they will be in touch with you regarding the Kaletra and if there are other options that do not interact as heavily with oral blood thinning medications. There were no signs of blood clots traveling to the lungs and your vital signs were normal. We deemed you stable for discharge but you should follow up with hematology (Dr. Zeng) and HIV medicine within two weeks. PRINCIPAL DISCHARGE DIAGNOSIS  Diagnosis: Acute deep vein thrombosis (DVT) of popliteal vein of right lower extremity  Assessment and Plan of Treatment: You were found to have an acute DVT which is a clot of the veins of the leg. This is likely due to your history of prothrombin gene mutation for which you have been taking Eliquis. However, you are on a decreased dose of Eliquis due to interaction with your Kaletra. For this reason, we put you on Lovenox, a blood thinner that does not have this interaction. This is a once daily injection and you were instructed on how to use the injectible. A 30 day supply was sent to your pharmacy. We contacted your HIV specialist and they will be in touch with you regarding the Kaletra and if there are other options that do not interact as heavily with oral blood thinning medications. There were no signs of blood clots traveling to the lungs and your vital signs were normal. We deemed you stable for discharge but you should follow up with hematology (Dr. Correa) and HIV medicine within two weeks.  We have made you an appointment with one of our hematologists, Dr. Correa. Please contact your PCP to put through a referral for this appointment. Your appointment is on April 19th at 1030am. Please call to reschedule if you are unable to make this time.

## 2019-04-03 NOTE — DISCHARGE NOTE PROVIDER - HOSPITAL COURSE
Patient is a 59 y/o male w hx of HIV (VLUD, well controlled on ARVs), DVT/PE 2 years ago (on Eliquis), prothrombin gene mutation, HTN, HLD, TAA presenting for evaluation of R calf pain. Patient states that 1 week ago he was working out when he began to notice pain and swelling in his left calf. 5 days ago he took a flight from [place] to [place]. He continued to have worsening pain and swelling and was sent to the ED by his primary for an ultrasound. In the ED patient was found to have nonocclusive thrombus of the R popliteal and R proximal greater saphenous. Patient has been taking Eliquis 2.5 bid regularly for the past two years and has not had any blood clots since then. He denies any shortness of breath, cough/hemoptysis, orthopnea, dizziness, lightheadedness, fevers, palpitations, chest pain, or abdominal pain. In the ED patient received a heparin bolus and was started on heparin drip. Patient was seen by hematology and transitioned to Lovenox which will continued outpatient. Decreased dose of eliquis given interaction with Kaletra was considered as possible cause. Labs were drawn to determine if patient has new hypercoagulable cause and patient was discharged for follow up with Dr. Zeng and his HIV specialist.

## 2019-04-03 NOTE — DISCHARGE NOTE PROVIDER - CARE PROVIDER_API CALL
Nilay Zeng)  Blood BankingTransfusion Med; Hematology; Internal Medicine  [address] [address]  Phone: [phone]  Fax: [phone]  Follow Up Time: Rocio Correa)  Medical Oncology  [address] [id number]  Phone: [phone]  Fax: [phone]  Follow Up Time:

## 2019-04-05 DIAGNOSIS — I10 ESSENTIAL (PRIMARY) HYPERTENSION: ICD-10-CM

## 2019-04-05 DIAGNOSIS — I82.431 ACUTE EMBOLISM AND THROMBOSIS OF RIGHT POPLITEAL VEIN: ICD-10-CM

## 2019-04-05 DIAGNOSIS — E78.5 HYPERLIPIDEMIA, UNSPECIFIED: ICD-10-CM

## 2019-04-05 DIAGNOSIS — D68.52 PROTHROMBIN GENE MUTATION: ICD-10-CM

## 2019-04-05 DIAGNOSIS — I71.2 THORACIC AORTIC ANEURYSM, WITHOUT RUPTURE: ICD-10-CM

## 2019-04-05 DIAGNOSIS — Z86.711 PERSONAL HISTORY OF PULMONARY EMBOLISM: ICD-10-CM

## 2019-04-05 DIAGNOSIS — Z79.01 LONG TERM (CURRENT) USE OF ANTICOAGULANTS: ICD-10-CM

## 2019-04-05 DIAGNOSIS — Z91.041 RADIOGRAPHIC DYE ALLERGY STATUS: ICD-10-CM

## 2019-04-05 DIAGNOSIS — Z21 ASYMPTOMATIC HUMAN IMMUNODEFICIENCY VIRUS [HIV] INFECTION STATUS: ICD-10-CM

## 2019-04-05 DIAGNOSIS — Z86.718 PERSONAL HISTORY OF OTHER VENOUS THROMBOSIS AND EMBOLISM: ICD-10-CM

## 2019-04-05 LAB
B2 GLYCOPROT1 AB SER QL: NEGATIVE — SIGNIFICANT CHANGE UP
CARDIOLIPIN AB SER-ACNC: NEGATIVE — SIGNIFICANT CHANGE UP

## 2019-06-20 ENCOUNTER — INPATIENT (INPATIENT)
Facility: HOSPITAL | Age: 59
LOS: 0 days | Discharge: LEFT BEFORE DISCHARGE | DRG: 69 | End: 2019-06-21
Attending: PSYCHIATRY & NEUROLOGY | Admitting: PSYCHIATRY & NEUROLOGY
Payer: COMMERCIAL

## 2019-06-20 VITALS
WEIGHT: 190.48 LBS | SYSTOLIC BLOOD PRESSURE: 153 MMHG | TEMPERATURE: 98 F | OXYGEN SATURATION: 99 % | HEART RATE: 75 BPM | DIASTOLIC BLOOD PRESSURE: 79 MMHG | RESPIRATION RATE: 18 BRPM

## 2019-06-20 DIAGNOSIS — I82.409 ACUTE EMBOLISM AND THROMBOSIS OF UNSPECIFIED DEEP VEINS OF UNSPECIFIED LOWER EXTREMITY: ICD-10-CM

## 2019-06-20 DIAGNOSIS — I63.9 CEREBRAL INFARCTION, UNSPECIFIED: ICD-10-CM

## 2019-06-20 DIAGNOSIS — R63.8 OTHER SYMPTOMS AND SIGNS CONCERNING FOOD AND FLUID INTAKE: ICD-10-CM

## 2019-06-20 DIAGNOSIS — B20 HUMAN IMMUNODEFICIENCY VIRUS [HIV] DISEASE: ICD-10-CM

## 2019-06-20 DIAGNOSIS — D68.52 PROTHROMBIN GENE MUTATION: ICD-10-CM

## 2019-06-20 DIAGNOSIS — I26.99 OTHER PULMONARY EMBOLISM WITHOUT ACUTE COR PULMONALE: ICD-10-CM

## 2019-06-20 DIAGNOSIS — I10 ESSENTIAL (PRIMARY) HYPERTENSION: ICD-10-CM

## 2019-06-20 LAB
ALBUMIN SERPL ELPH-MCNC: 2.6 G/DL — LOW (ref 3.4–5)
ALP SERPL-CCNC: 34 U/L — LOW (ref 40–120)
ALT FLD-CCNC: 40 U/L — SIGNIFICANT CHANGE UP (ref 12–42)
ANION GAP SERPL CALC-SCNC: 6 MMOL/L — LOW (ref 9–16)
APTT BLD: 22.5 SEC — LOW (ref 27.5–36.3)
AST SERPL-CCNC: 37 U/L — SIGNIFICANT CHANGE UP (ref 15–37)
BASOPHILS NFR BLD AUTO: 0.5 % — SIGNIFICANT CHANGE UP (ref 0–2)
BILIRUB SERPL-MCNC: 0.3 MG/DL — SIGNIFICANT CHANGE UP (ref 0.2–1.2)
BUN SERPL-MCNC: 7 MG/DL — SIGNIFICANT CHANGE UP (ref 7–23)
CALCIUM SERPL-MCNC: 8.4 MG/DL — LOW (ref 8.5–10.5)
CHLORIDE SERPL-SCNC: 105 MMOL/L — SIGNIFICANT CHANGE UP (ref 96–108)
CO2 SERPL-SCNC: 27 MMOL/L — SIGNIFICANT CHANGE UP (ref 22–31)
CREAT SERPL-MCNC: 1.17 MG/DL — SIGNIFICANT CHANGE UP (ref 0.5–1.3)
EOSINOPHIL NFR BLD AUTO: 1.4 % — SIGNIFICANT CHANGE UP (ref 0–6)
GLUCOSE SERPL-MCNC: 106 MG/DL — HIGH (ref 70–99)
HCT VFR BLD CALC: 42.9 % — SIGNIFICANT CHANGE UP (ref 39–50)
HGB BLD-MCNC: 14.4 G/DL — SIGNIFICANT CHANGE UP (ref 13–17)
IMM GRANULOCYTES NFR BLD AUTO: 0.1 % — SIGNIFICANT CHANGE UP (ref 0–1.5)
INR BLD: 1.02 — SIGNIFICANT CHANGE UP (ref 0.88–1.16)
LYMPHOCYTES # BLD AUTO: 40.9 % — SIGNIFICANT CHANGE UP (ref 13–44)
MCHC RBC-ENTMCNC: 29.5 PG — SIGNIFICANT CHANGE UP (ref 27–34)
MCHC RBC-ENTMCNC: 33.6 G/DL — SIGNIFICANT CHANGE UP (ref 32–36)
MCV RBC AUTO: 87.9 FL — SIGNIFICANT CHANGE UP (ref 80–100)
MONOCYTES NFR BLD AUTO: 9.8 % — SIGNIFICANT CHANGE UP (ref 2–14)
NEUTROPHILS NFR BLD AUTO: 47.3 % — SIGNIFICANT CHANGE UP (ref 43–77)
PLATELET # BLD AUTO: 240 K/UL — SIGNIFICANT CHANGE UP (ref 150–400)
POTASSIUM SERPL-MCNC: 4 MMOL/L — SIGNIFICANT CHANGE UP (ref 3.5–5.3)
POTASSIUM SERPL-SCNC: 4 MMOL/L — SIGNIFICANT CHANGE UP (ref 3.5–5.3)
PROT SERPL-MCNC: 6.5 G/DL — SIGNIFICANT CHANGE UP (ref 6.4–8.2)
PROTHROM AB SERPL-ACNC: 11.3 SEC — SIGNIFICANT CHANGE UP (ref 10–12.9)
RBC # BLD: 4.88 M/UL — SIGNIFICANT CHANGE UP (ref 4.2–5.8)
RBC # FLD: 16.8 % — HIGH (ref 10.3–14.5)
SODIUM SERPL-SCNC: 138 MMOL/L — SIGNIFICANT CHANGE UP (ref 132–145)
WBC # BLD: 7.3 K/UL — SIGNIFICANT CHANGE UP (ref 3.8–10.5)
WBC # FLD AUTO: 7.3 K/UL — SIGNIFICANT CHANGE UP (ref 3.8–10.5)

## 2019-06-20 PROCEDURE — 99285 EMERGENCY DEPT VISIT HI MDM: CPT

## 2019-06-20 PROCEDURE — 70450 CT HEAD/BRAIN W/O DYE: CPT | Mod: 26

## 2019-06-20 RX ORDER — LOSARTAN POTASSIUM 100 MG/1
25 TABLET, FILM COATED ORAL
Refills: 0 | Status: DISCONTINUED | OUTPATIENT
Start: 2019-06-20 | End: 2019-06-21

## 2019-06-20 RX ORDER — DOLUTEGRAVIR SODIUM 25 MG/1
50 TABLET, FILM COATED ORAL AT BEDTIME
Refills: 0 | Status: DISCONTINUED | OUTPATIENT
Start: 2019-06-20 | End: 2019-06-21

## 2019-06-20 RX ORDER — RILPIVIRINE HYDROCHLORIDE 25 MG/1
25 TABLET, FILM COATED ORAL AT BEDTIME
Refills: 0 | Status: DISCONTINUED | OUTPATIENT
Start: 2019-06-20 | End: 2019-06-21

## 2019-06-20 RX ORDER — METOPROLOL TARTRATE 50 MG
25 TABLET ORAL
Refills: 0 | Status: DISCONTINUED | OUTPATIENT
Start: 2019-06-20 | End: 2019-06-21

## 2019-06-20 RX ORDER — METOPROLOL TARTRATE 50 MG
50 TABLET ORAL ONCE
Refills: 0 | Status: COMPLETED | OUTPATIENT
Start: 2019-06-20 | End: 2019-06-20

## 2019-06-20 RX ORDER — BICTEGRAVIR SODIUM, EMTRICITABINE, AND TENOFOVIR ALAFENAMIDE FUMARATE 30; 120; 15 MG/1; MG/1; MG/1
1 TABLET ORAL
Qty: 0 | Refills: 0 | DISCHARGE

## 2019-06-20 RX ORDER — LOSARTAN POTASSIUM 100 MG/1
1 TABLET, FILM COATED ORAL
Qty: 0 | Refills: 0 | DISCHARGE

## 2019-06-20 RX ORDER — LOSARTAN POTASSIUM 100 MG/1
25 TABLET, FILM COATED ORAL ONCE
Refills: 0 | Status: COMPLETED | OUTPATIENT
Start: 2019-06-20 | End: 2019-06-20

## 2019-06-20 RX ORDER — ATORVASTATIN CALCIUM 80 MG/1
10 TABLET, FILM COATED ORAL AT BEDTIME
Refills: 0 | Status: DISCONTINUED | OUTPATIENT
Start: 2019-06-20 | End: 2019-06-21

## 2019-06-20 RX ORDER — HEPARIN SODIUM 5000 [USP'U]/ML
INJECTION INTRAVENOUS; SUBCUTANEOUS
Qty: 25000 | Refills: 0 | Status: DISCONTINUED | OUTPATIENT
Start: 2019-06-20 | End: 2019-06-21

## 2019-06-20 RX ADMIN — LOSARTAN POTASSIUM 25 MILLIGRAM(S): 100 TABLET, FILM COATED ORAL at 20:39

## 2019-06-20 RX ADMIN — HEPARIN SODIUM 1100 UNIT(S)/HR: 5000 INJECTION INTRAVENOUS; SUBCUTANEOUS at 21:49

## 2019-06-20 RX ADMIN — Medication 50 MILLIGRAM(S): at 20:39

## 2019-06-20 NOTE — H&P ADULT - NSHPLABSRESULTS_GEN_ALL_CORE
.  LABS:                         14.4   7.3   )-----------( 240      ( 20 Jun 2019 17:00 )             42.9     06-20    138  |  105  |  7   ----------------------------<  106<H>  4.0   |  27  |  1.17    Ca    8.4<L>      20 Jun 2019 17:00    TPro  6.5  /  Alb  2.6<L>  /  TBili  0.3  /  DBili  x   /  AST  37  /  ALT  40  /  AlkPhos  34<L>  06-20    PT/INR - ( 20 Jun 2019 17:00 )   PT: 11.3 sec;   INR: 1.02          PTT - ( 20 Jun 2019 17:00 )  PTT:22.5 sec              RADIOLOGY, EKG & ADDITIONAL TESTS: Reviewed.   < from: CT Head No Cont (06.20.19 @ 16:38) >    mpression: No acute intracranial injury.    < end of copied text >

## 2019-06-20 NOTE — ED PROVIDER NOTE - PMH
DVT (deep venous thrombosis)    HIV (human immunodeficiency virus infection)    Prothrombin gene mutation    Pulmonary emboli

## 2019-06-20 NOTE — ED PROVIDER NOTE - OBJECTIVE STATEMENT
59 y/o male with PMHx of DVT, PE, prothrombin gene mutation, takes Eliquis 5 mg bid (last dose today), HIV, allergy to IV contrast, presents to the ED c/o an episode of aphasia/speech pattern changes and decreased dexterity when typing x 3 days ago. Patient states his speech returned to baseline after appx 30 minutes. He reports his dexterity had appx 90% recovery and he has still noticed some difficulty when texting and typing. Pt additionally notes increased fatigue and weakness over the past several days. Pt denies HA, dizziness, or CP. Pt is a non-smoker. Of note, he follows up with his PCP and hematologist regularly. 57 y/o male with PMHx of DVT, PE, prothrombin gene mutation, polycythemia, takes Eliquis 5 mg bid (last dose today), HIV, allergy to IV contrast, presents to the ED c/o an episode of difficulty speaking and decreased dexterity when typing x 3 days ago. Patient states his speech returned to baseline after appx 30 minutes. He reports his dexterity had appx 90% recovery and he has still noticed some difficulty when texting and typing. Pt additionally notes increased fatigue and weakness over the past several days. He reports going to his PCP today where he was advised to come to the ED for further evaluation. Pt denies HA, dizziness, or CP. Pt is a non-smoker. Of note, he follows up with his PCP and hematologist regularly.

## 2019-06-20 NOTE — ED ADULT NURSE NOTE - NSIMPLEMENTINTERV_GEN_ALL_ED
Implemented All Universal Safety Interventions:  [place] to call system. Call bell, personal items and telephone within reach. Instruct patient to call for assistance. Room bathroom lighting operational. Non-slip footwear when patient is off stretcher. Physically safe environment: no spills, clutter or unnecessary equipment. Stretcher in lowest position, wheels locked, appropriate side rails in place.

## 2019-06-20 NOTE — ED ADULT NURSE REASSESSMENT NOTE - NS ED NURSE REASSESS COMMENT FT1
received pt from NELLY Weston- pt noted hypertensive- evening medications administered as ordered. pt provided food and drink at this time. resting comfortably in stretcher. denies any symptoms. awaiting transfer to [facility].

## 2019-06-20 NOTE — H&P ADULT - HISTORY OF PRESENT ILLNESS
58 M with h/o HIV, HTN, PE's DVT's. Strokes , Polycythemia Vera, Prothrombin Gene on Eliquis mutation presents for [facility] for stroke workup.   Pt was in [place] on Monday and noticed his speech was slurred and had expressive aphasia, which resolved in 30mins or so.   Pt then flew back to [facility] on [place]. Pt visited his PMD on Wednesday and was told to go get evaluated in the ER. At [facility] CT brain negative for acute stroke  Pt endorses feeling back to his normal.   Pt's 12 point ROS negative

## 2019-06-20 NOTE — ED ADULT NURSE NOTE - OBJECTIVE STATEMENT
Pt presents to ED with episode of difficulty speaking and decreased dexterity when typing x 3 days ago. Patient states his speech returned to baseline after appx 30 minutes. Also reports dexterity t when texting and typing. Pt additionally notes increased fatigue and weakness over the past several days.

## 2019-06-20 NOTE — H&P ADULT - ASSESSMENT
59 yo M with h/o as above presenting to ED to evaluate for Stroke as pt experienced TIA like symptoms on Monday on Eliquis given pt's medical history

## 2019-06-20 NOTE — H&P ADULT - ATTENDING COMMENTS
Patient seen and examined with PA.  Agree with above.  58 year-old RH male with multiple stroke risk factors including HTN, hyperlipidemia, prothrombin gene mutation discovered during workup for PE and multiple DVTs and family history of stroke at young age (dad at 54, sister at 49) presents with trouble forming sentences and misspelled words in texts on Monday.  He's doing fine now.  Neurological exam is intact.    Plan:  1) Secondary stroke prevention -   a) Switch to Lovenox mg/kg BID for therapeutic dose while workup in progress.  Will need to discuss other NOAC since seems to have failed Eliquis with new stroke-like event  b) Continue Atorvastatin 80mg for statin.  He's on Pravastatin at home.  Please send lipid profile.    2) Stroke workup -   a) MRI Brain without teodora  b) Vascular imaging - MRA Head without teodora and carotid dopplers since had reaction to IV contrast.  Can consider pretreatment if contrast deemed necessary.  c) Echocardiogram with bubble - consideration for LAUREN depending on the results of full workup.  Can consider as outpt.  May need GI clearance given history of stricture.  d) PT/OT evaluation    3) Stroke risk factors -   a) HTN - better control - can start his home meds and adjust for sbp less than 140  b) Hyperlipidemia - see above  c) Hypercoagulable status - see above.  as per Dr. Correa, Hematology.  d) family history of CVA at young age  No DM.  Never smoker     4) DVT prophylaxis - Lovenox and SCDs

## 2019-06-20 NOTE — ED ADULT TRIAGE NOTE - CHIEF COMPLAINT QUOTE
Pt states he had a 30min episode 3 days ago of where he couldn't type as he usually can. Since has been feeling fatigued and sore. Pt is on Eliquis due to hx PE and DVT. AAOX4 in triage. No slurring of speech, able to move all 4 limbs. Denies HA, dizziness or CP.

## 2019-06-20 NOTE — ED PROVIDER NOTE - PROGRESS NOTE DETAILS
Spoke with neurologist Dr. Toledo who recommends pt to be admitted for stroke work-up. labs OK, CT with no acute findings, several attempts to contact stroke attending. Spoke with neurologist Dr. Toledo who recommends pt to be admitted for stroke work-up, will ikely need to find additional AC to prevent stroke.

## 2019-06-20 NOTE — H&P ADULT - NSHPPHYSICALEXAM_GEN_ALL_CORE
.  VITAL SIGNS:  T(C): 37.2 (06-20-19 @ 21:52), Max: 37.2 (06-20-19 @ 21:52)  T(F): 98.9 (06-20-19 @ 21:52), Max: 98.9 (06-20-19 @ 21:52)  HR: 67 (06-20-19 @ 21:52) (67 - 75)  BP: 164/86 (06-20-19 @ 21:52) (152/87 - 174/80)  BP(mean): --  RR: 18 (06-20-19 @ 21:52) (16 - 18)  SpO2: 96% (06-20-19 @ 21:52) (96% - 99%)  Wt(kg): --    PHYSICAL EXAM:    Constitutional: WDWN resting comfortably in bed; NAD  Head: NC/AT  Eyes: PERRL, EOMI, anicteric sclera  ENT: no nasal discharge; uvula midline, no oropharyngeal erythema or exudates; MMM  Neck: supple; no JVD or thyromegaly  Respiratory: CTA B/L; no W/R/R, no retractions  Cardiac: +S1/S2; RRR; no M/R/G; PMI non-displaced  Gastrointestinal: abdomen soft, NT/ND; no rebound or guarding; +BSx4  Back: spine midline, no bony tenderness or step-offs; no CVAT B/L  Extremities: WWP, no clubbing or cyanosis; no peripheral edema  Musculoskeletal: NROM x4; no joint swelling, tenderness or erythema  Vascular: 2+ radial, femoral, DP/PT pulses B/L  Lymphatic: no submandibular or cervical LAD  Neurologic: AAOx3; CNII-XII grossly intact; Pt with slight facial dyssymmetry, Hand alternating movements off on the left side.

## 2019-06-20 NOTE — ED PROVIDER NOTE - CLINICAL SUMMARY MEDICAL DECISION MAKING FREE TEXT BOX
57 y/o male with h/o DVT, PE, family h/o stroke, presents with an episode of speech pattern changes and decreased dexterity x 3 days ago. Will do CTA head and neck. 59 y/o male with h/o DVT, PE, prothrombin gene mutation, polycythemia, family h/o stroke. Medical history high risk for stroke/clot. Symptoms c/w TIA although completely resolved. Will check CT head non-contrast. Attempted x 3 to contact stroke team for evaluation. Pt has contrast allergy and would like neuro input regarding need for CTA.

## 2019-06-20 NOTE — ED ADULT NURSE REASSESSMENT NOTE - NS ED NURSE REASSESS COMMENT FT1
pt transported at this time. heparin running as per order. MD Villanueva aware of VS. pt comfortably resting at this time.

## 2019-06-20 NOTE — H&P ADULT - PROBLEM SELECTOR PLAN 1
Pt with h/o stroke. Found to have Prothrombin gene mutation. Pt had TIA episode of Eliquis.   -MRI brain   -Plan for LAUREN  -Heparin drip goal aPTT 60-80

## 2019-06-20 NOTE — H&P ADULT - NSICDXPASTMEDICALHX_GEN_ALL_CORE_FT
PAST MEDICAL HISTORY:  DVT (deep venous thrombosis)     HIV (human immunodeficiency virus infection)     Prothrombin gene mutation     Pulmonary emboli

## 2019-06-21 ENCOUNTER — TRANSCRIPTION ENCOUNTER (OUTPATIENT)
Age: 59
End: 2019-06-21

## 2019-06-21 VITALS
DIASTOLIC BLOOD PRESSURE: 87 MMHG | HEART RATE: 77 BPM | SYSTOLIC BLOOD PRESSURE: 158 MMHG | TEMPERATURE: 98 F | OXYGEN SATURATION: 97 % | RESPIRATION RATE: 16 BRPM

## 2019-06-21 LAB
ANION GAP SERPL CALC-SCNC: 7 MMOL/L — SIGNIFICANT CHANGE UP (ref 5–17)
APTT BLD: 31.7 SEC — SIGNIFICANT CHANGE UP (ref 27.5–36.3)
APTT BLD: 32.1 SEC — SIGNIFICANT CHANGE UP (ref 27.5–36.3)
APTT BLD: 32.6 SEC — SIGNIFICANT CHANGE UP (ref 27.5–36.3)
BASOPHILS # BLD AUTO: 0.05 K/UL — SIGNIFICANT CHANGE UP (ref 0–0.2)
BASOPHILS NFR BLD AUTO: 0.9 % — SIGNIFICANT CHANGE UP (ref 0–2)
BUN SERPL-MCNC: 7 MG/DL — SIGNIFICANT CHANGE UP (ref 7–23)
CALCIUM SERPL-MCNC: 8.7 MG/DL — SIGNIFICANT CHANGE UP (ref 8.4–10.5)
CHLORIDE SERPL-SCNC: 100 MMOL/L — SIGNIFICANT CHANGE UP (ref 96–108)
CO2 SERPL-SCNC: 27 MMOL/L — SIGNIFICANT CHANGE UP (ref 22–31)
CREAT SERPL-MCNC: 1.1 MG/DL — SIGNIFICANT CHANGE UP (ref 0.5–1.3)
EOSINOPHIL # BLD AUTO: 0.08 K/UL — SIGNIFICANT CHANGE UP (ref 0–0.5)
EOSINOPHIL NFR BLD AUTO: 1.4 % — SIGNIFICANT CHANGE UP (ref 0–6)
GLUCOSE SERPL-MCNC: 132 MG/DL — HIGH (ref 70–99)
HCT VFR BLD CALC: 45.4 % — SIGNIFICANT CHANGE UP (ref 39–50)
HCV AB S/CO SERPL IA: 0.12 S/CO — SIGNIFICANT CHANGE UP
HCV AB SERPL-IMP: SIGNIFICANT CHANGE UP
HGB BLD-MCNC: 14.9 G/DL — SIGNIFICANT CHANGE UP (ref 13–17)
IMM GRANULOCYTES NFR BLD AUTO: 0.2 % — SIGNIFICANT CHANGE UP (ref 0–1.5)
INR BLD: 1.07 — SIGNIFICANT CHANGE UP (ref 0.88–1.16)
LYMPHOCYTES # BLD AUTO: 2.15 K/UL — SIGNIFICANT CHANGE UP (ref 1–3.3)
LYMPHOCYTES # BLD AUTO: 38.7 % — SIGNIFICANT CHANGE UP (ref 13–44)
MCHC RBC-ENTMCNC: 29.6 PG — SIGNIFICANT CHANGE UP (ref 27–34)
MCHC RBC-ENTMCNC: 32.8 GM/DL — SIGNIFICANT CHANGE UP (ref 32–36)
MCV RBC AUTO: 90.1 FL — SIGNIFICANT CHANGE UP (ref 80–100)
MONOCYTES # BLD AUTO: 0.45 K/UL — SIGNIFICANT CHANGE UP (ref 0–0.9)
MONOCYTES NFR BLD AUTO: 8.1 % — SIGNIFICANT CHANGE UP (ref 2–14)
NEUTROPHILS # BLD AUTO: 2.81 K/UL — SIGNIFICANT CHANGE UP (ref 1.8–7.4)
NEUTROPHILS NFR BLD AUTO: 50.7 % — SIGNIFICANT CHANGE UP (ref 43–77)
NRBC # BLD: 0 /100 WBCS — SIGNIFICANT CHANGE UP (ref 0–0)
PLATELET # BLD AUTO: 247 K/UL — SIGNIFICANT CHANGE UP (ref 150–400)
POTASSIUM SERPL-MCNC: 4 MMOL/L — SIGNIFICANT CHANGE UP (ref 3.5–5.3)
POTASSIUM SERPL-SCNC: 4 MMOL/L — SIGNIFICANT CHANGE UP (ref 3.5–5.3)
PROTHROM AB SERPL-ACNC: 12.1 SEC — SIGNIFICANT CHANGE UP (ref 10–12.9)
RBC # BLD: 5.04 M/UL — SIGNIFICANT CHANGE UP (ref 4.2–5.8)
RBC # FLD: 16.7 % — HIGH (ref 10.3–14.5)
SODIUM SERPL-SCNC: 134 MMOL/L — LOW (ref 135–145)
WBC # BLD: 5.55 K/UL — SIGNIFICANT CHANGE UP (ref 3.8–10.5)
WBC # FLD AUTO: 5.55 K/UL — SIGNIFICANT CHANGE UP (ref 3.8–10.5)

## 2019-06-21 PROCEDURE — 86803 HEPATITIS C AB TEST: CPT

## 2019-06-21 PROCEDURE — 80053 COMPREHEN METABOLIC PANEL: CPT

## 2019-06-21 PROCEDURE — 80048 BASIC METABOLIC PNL TOTAL CA: CPT

## 2019-06-21 PROCEDURE — 99285 EMERGENCY DEPT VISIT HI MDM: CPT | Mod: 25

## 2019-06-21 PROCEDURE — 36415 COLL VENOUS BLD VENIPUNCTURE: CPT

## 2019-06-21 PROCEDURE — 93306 TTE W/DOPPLER COMPLETE: CPT

## 2019-06-21 PROCEDURE — 93306 TTE W/DOPPLER COMPLETE: CPT | Mod: 26

## 2019-06-21 PROCEDURE — 93880 EXTRACRANIAL BILAT STUDY: CPT | Mod: 26

## 2019-06-21 PROCEDURE — 70450 CT HEAD/BRAIN W/O DYE: CPT

## 2019-06-21 PROCEDURE — 85730 THROMBOPLASTIN TIME PARTIAL: CPT

## 2019-06-21 PROCEDURE — 70544 MR ANGIOGRAPHY HEAD W/O DYE: CPT

## 2019-06-21 PROCEDURE — 96374 THER/PROPH/DIAG INJ IV PUSH: CPT

## 2019-06-21 PROCEDURE — 70551 MRI BRAIN STEM W/O DYE: CPT | Mod: 26

## 2019-06-21 PROCEDURE — 93880 EXTRACRANIAL BILAT STUDY: CPT

## 2019-06-21 PROCEDURE — 99223 1ST HOSP IP/OBS HIGH 75: CPT

## 2019-06-21 PROCEDURE — 85610 PROTHROMBIN TIME: CPT

## 2019-06-21 PROCEDURE — 85025 COMPLETE CBC W/AUTO DIFF WBC: CPT

## 2019-06-21 PROCEDURE — 70551 MRI BRAIN STEM W/O DYE: CPT

## 2019-06-21 RX ORDER — ENOXAPARIN SODIUM 100 MG/ML
90 INJECTION SUBCUTANEOUS EVERY 12 HOURS
Refills: 0 | Status: DISCONTINUED | OUTPATIENT
Start: 2019-06-21 | End: 2019-06-21

## 2019-06-21 RX ORDER — HEPARIN SODIUM 5000 [USP'U]/ML
1600 INJECTION INTRAVENOUS; SUBCUTANEOUS
Qty: 25000 | Refills: 0 | Status: DISCONTINUED | OUTPATIENT
Start: 2019-06-21 | End: 2019-06-21

## 2019-06-21 RX ORDER — ENOXAPARIN SODIUM 100 MG/ML
80 INJECTION SUBCUTANEOUS
Qty: 4800 | Refills: 0
Start: 2019-06-21 | End: 2019-07-20

## 2019-06-21 RX ORDER — HEPARIN SODIUM 5000 [USP'U]/ML
1300 INJECTION INTRAVENOUS; SUBCUTANEOUS
Qty: 25000 | Refills: 0 | Status: DISCONTINUED | OUTPATIENT
Start: 2019-06-21 | End: 2019-06-21

## 2019-06-21 RX ADMIN — ENOXAPARIN SODIUM 90 MILLIGRAM(S): 100 INJECTION SUBCUTANEOUS at 15:39

## 2019-06-21 RX ADMIN — Medication 25 MILLIGRAM(S): at 06:24

## 2019-06-21 RX ADMIN — LOSARTAN POTASSIUM 25 MILLIGRAM(S): 100 TABLET, FILM COATED ORAL at 06:24

## 2019-06-21 RX ADMIN — HEPARIN SODIUM 13 UNIT(S)/HR: 5000 INJECTION INTRAVENOUS; SUBCUTANEOUS at 03:11

## 2019-06-21 RX ADMIN — HEPARIN SODIUM 16 UNIT(S)/HR: 5000 INJECTION INTRAVENOUS; SUBCUTANEOUS at 13:06

## 2019-06-21 NOTE — PROGRESS NOTE ADULT - PROBLEM SELECTOR PLAN 3
as above.   no lower extremity swelling. Pt with hx of DVT of RLE in 2017. Currently on eliquis at home, no signs/symptoms of DVT on exam   - heme/onc consult   - f/up Dr. Madeline chuns

## 2019-06-21 NOTE — PROGRESS NOTE ADULT - PROBLEM SELECTOR PLAN 5
Pt on Juluca for HIV.   Will order Dolutegravir and Rilpivirine separately. Pt on Juluca for HIV. Will c/w Dolutegravir and Rilpivirine separately.

## 2019-06-21 NOTE — PROGRESS NOTE ADULT - SUBJECTIVE AND OBJECTIVE BOX
Patient is a 58y old  Male who presents with a chief complaint of     INTERVAL HPI/OVERNIGHT EVENTS:  ICU Vital Signs Last 24 Hrs  T(C): 36.7 (21 Jun 2019 05:22), Max: 37.2 (20 Jun 2019 21:52)  T(F): 98.1 (21 Jun 2019 05:22), Max: 98.9 (20 Jun 2019 21:52)  HR: 66 (21 Jun 2019 05:22) (64 - 75)  BP: 148/84 (21 Jun 2019 05:22) (148/84 - 174/80)  BP(mean): --  ABP: --  ABP(mean): --  RR: 15 (21 Jun 2019 05:22) (15 - 18)  SpO2: 95% (21 Jun 2019 05:22) (95% - 99%)    I&O's Summary    20 Jun 2019 07:01  -  21 Jun 2019 07:00  --------------------------------------------------------  IN: 142 mL / OUT: 0 mL / NET: 142 mL          LABS:                        14.9   5.55  )-----------( 247      ( 21 Jun 2019 08:37 )             45.4     06-20    138  |  105  |  7   ----------------------------<  106<H>  4.0   |  27  |  1.17    Ca    8.4<L>      20 Jun 2019 17:00    TPro  6.5  /  Alb  2.6<L>  /  TBili  0.3  /  DBili  x   /  AST  37  /  ALT  40  /  AlkPhos  34<L>  06-20    PT/INR - ( 20 Jun 2019 17:00 )   PT: 11.3 sec;   INR: 1.02          PTT - ( 21 Jun 2019 02:36 )  PTT:32.1 sec    CAPILLARY BLOOD GLUCOSE            RADIOLOGY & ADDITIONAL TESTS:    Consultant(s) Notes Reviewed:  [x ] YES  [ ] NO    MEDICATIONS  (STANDING):  atorvastatin 10 milliGRAM(s) Oral at bedtime  dolutegravir 50 milliGRAM(s) Oral at bedtime  heparin  Infusion 1300 Unit(s)/Hr (13 mL/Hr) IV Continuous <Continuous>  losartan 25 milliGRAM(s) Oral two times a day  metoprolol succinate ER 25 milliGRAM(s) Oral two times a day  rilpivirine 25 milliGRAM(s) Oral at bedtime    MEDICATIONS  (PRN):      PHYSICAL EXAM:  GENERAL: well built, well nourished  HEAD:  Atraumatic, Normocephalic  EYES: EOMI, PERRLA, conjunctiva and sclera clear  ENT: No tonsillar erythema, exudates, or enlargement; Moist mucous membranes, Good dentition, No lesions  NECK: Supple, No JVD, Normal thyroid, no enlarged nodes  NERVOUS SYSTEM:  Alert & Oriented X3, Good concentration; Motor Strength 5/5 B/L upper and lower extremities; DTRs 2+ intact and symmetric, sensory intact  CHEST/LUNG: B/L good air entry; No rales, rhonchi, or wheezing  HEART: S1S2 normal, no S3, Regular rate and rhythm; No murmurs, rubs, or gallops  ABDOMEN: Soft, Nontender, Nondistended; Bowel sounds present  EXTREMITIES:  2+ Peripheral Pulses, No clubbing, cyanosis, or edema  LYMPH: No lymphadenopathy noted  SKIN: No rashes or lesions    Care Discussed with Consultants/Other Providers [ x] YES  [ ] NO OVERNIGHT EVENTS:   LIZA    INTERVAL HPI:  Pt is examined at bedside. He appears well in NAD. Pt says aphasia has resolved. He denies any weakness, numbness, CP, SOB, headaches, vision changes, nausea/vomiting.     Vital Signs Last 24 Hrs  T(C): 36.7 (21 Jun 2019 05:22), Max: 37.2 (20 Jun 2019 21:52)  T(F): 98.1 (21 Jun 2019 05:22), Max: 98.9 (20 Jun 2019 21:52)  HR: 66 (21 Jun 2019 05:22) (64 - 75)  BP: 148/84 (21 Jun 2019 05:22) (148/84 - 174/80)  BP(mean): --  ABP: --  ABP(mean): --  RR: 15 (21 Jun 2019 05:22) (15 - 18)  SpO2: 95% (21 Jun 2019 05:22) (95% - 99%)    I&O's Summary    20 Jun 2019 07:01  -  21 Jun 2019 07:00  --------------------------------------------------------  IN: 142 mL / OUT: 0 mL / NET: 142 mL          LABS:                        14.9   5.55  )-----------( 247      ( 21 Jun 2019 08:37 )             45.4     06-20    138  |  105  |  7   ----------------------------<  106<H>  4.0   |  27  |  1.17    Ca    8.4<L>      20 Jun 2019 17:00    TPro  6.5  /  Alb  2.6<L>  /  TBili  0.3  /  DBili  x   /  AST  37  /  ALT  40  /  AlkPhos  34<L>  06-20    PT/INR - ( 20 Jun 2019 17:00 )   PT: 11.3 sec;   INR: 1.02          PTT - ( 21 Jun 2019 02:36 )  PTT:32.1 sec    CAPILLARY BLOOD GLUCOSE            RADIOLOGY & ADDITIONAL TESTS:    Consultant(s) Notes Reviewed:  [x ] YES  [ ] NO    MEDICATIONS  (STANDING):  atorvastatin 10 milliGRAM(s) Oral at bedtime  dolutegravir 50 milliGRAM(s) Oral at bedtime  heparin  Infusion 1300 Unit(s)/Hr (13 mL/Hr) IV Continuous <Continuous>  losartan 25 milliGRAM(s) Oral two times a day  metoprolol succinate ER 25 milliGRAM(s) Oral two times a day  rilpivirine 25 milliGRAM(s) Oral at bedtime    MEDICATIONS  (PRN):      PHYSICAL EXAM:  GENERAL: NAD  ENT: No tonsillar erythema, exudates, or enlargement; Moist mucous membranes, Good dentition, No lesions  NECK: Supple, No JVD, Normal thyroid, no enlarged nodes  NERVOUS SYSTEM:  Alert & Oriented X3, Motor Strength 5/5 B/L upper and lower extremities; DTRs 2+ intact and symmetric, sensory intact. + Left dysmetria.   CHEST/LUNG: B/L good air entry; No rales, rhonchi, or wheezing  HEART: S1S2 normal, no S3, Regular rate and rhythm; No murmurs, rubs, or gallops  ABDOMEN: Soft, Nontender, Nondistended; Bowel sounds present  EXTREMITIES:  2+ Peripheral Pulses, No clubbing, cyanosis, or edema  LYMPH: No lymphadenopathy noted  SKIN: No rashes or lesions    Care Discussed with Consultants/Other Providers [ x] YES  [ ] NO

## 2019-06-21 NOTE — CONSULT NOTE ADULT - SUBJECTIVE AND OBJECTIVE BOX
This is a 57 YO M with a history of HIV, HTN, PE and DVTs, Polycythemia vera, prothrombin gene on Eliquis who presented for a stroke work up after experiencing 20 minutes of slurred speech. Gastroenterology consulted due to a history of esophageal strictures. Patient states that he developed dysphagia in his early 20s and at the time he had an endoscopy with dilation. He is not sure what the stricture was from. He states that he has had 4-5 dilatations, last one was 3 years ago at [facility] ( does not recall who performed the procedure). He states that he is non compliant with follow up appointment and does not see a gastroenterologist on a regular basis. He states that when he develops any signs of dysphagia he makes an appointment.  Denies any gastric ulcers. He denies any alcohol or NSAID use. He has no history of IBD. No family history of gastrointestinal malignancies No surgical history. Patient also had a colonoscopy three years ago, normal as per pt. Denies any current odynophagia, dysphagia to solids or liquids, food regurgitation, acid reflux, hematemesis,  abdominal pain, hematochezia or melena.           Allergies    IV contrast (Hives)  No Known Drug Allergies    Intolerances      Home Medications:  Juluca 50 mg-25 mg oral tablet: 1 tab(s) orally once a day (20 Jun 2019 23:45)  losartan 25 mg oral tablet: 1 tab(s) orally 2 times a day (20 Jun 2019 23:44)  metoprolol succinate 25 mg oral tablet, extended release: 1 tab(s) orally 2 times a day (02 Apr 2019 12:08)  pravastatin 20 mg oral tablet: 1 tab(s) orally once a day (02 Apr 2019 12:08)    MEDICATIONS:  MEDICATIONS  (STANDING):  atorvastatin 10 milliGRAM(s) Oral at bedtime  dolutegravir 50 milliGRAM(s) Oral at bedtime  enoxaparin Injectable 90 milliGRAM(s) SubCutaneous every 12 hours  losartan 25 milliGRAM(s) Oral two times a day  metoprolol succinate ER 25 milliGRAM(s) Oral two times a day  rilpivirine 25 milliGRAM(s) Oral at bedtime    MEDICATIONS  (PRN):    PAST MEDICAL & SURGICAL HISTORY:  Prothrombin gene mutation  Pulmonary emboli  DVT (deep venous thrombosis)  HIV (human immunodeficiency virus infection)  No significant past surgical history    FAMILY HISTORY:  Family history of CVA  FH: myocardial infarction    SOCIAL HISTORY:  [place]: [ ] Current, [ ] Former, [ X] Never; Pack Years:  Alcohol: once a month   Illicit Drugs: Denies     REVIEW OF SYSTEMS:  CONSTITUTIONAL: No weakness, fevers or chills  HEENT: No visual changes; No vertigo or throat pain   NECK: No pain or stiffness  RESPIRATORY: No cough, wheezing, hemoptysis; No shortness of breath  CARDIOVASCULAR: No chest pain or palpitations  GASTROINTESTINAL: No abdominal or epigastric pain. No nausea, vomiting, or hematemesis; No diarrhea or constipation. No melena or hematochezia.  GENITOURINARY: No dysuria, frequency or hematuria  NEUROLOGICAL: No numbness or weakness  SKIN: No itching, burning, rashes, or lesions   All other 10 review of systems is negative unless indicated above.    Vital Signs Last 24 Hrs  T(C): 36.6 (21 Jun 2019 13:33), Max: 37.2 (20 Jun 2019 21:52)  T(F): 97.9 (21 Jun 2019 13:33), Max: 98.9 (20 Jun 2019 21:52)  HR: 77 (21 Jun 2019 13:33) (64 - 77)  BP: 158/87 (21 Jun 2019 13:33) (148/84 - 174/80)  BP(mean): --  RR: 16 (21 Jun 2019 13:33) (15 - 18)  SpO2: 97% (21 Jun 2019 13:33) (95% - 98%)    06-20 @ 07:01  -  06-21 @ 07:00  --------------------------------------------------------  IN: 142 mL / OUT: 0 mL / NET: 142 mL        PHYSICAL EXAM:    General: Well developed; well nourished; in no acute distress  Eyes: Anicteric sclerae, moist conjunctivae  HENT: Moist mucous membranes  Neck: Trachea midline, supple  Lungs: Normal respiratory effors and no intercostal retractions  Cardiovascular: RRR  Abdomen: Soft, non-tender non-distended; Normal bowel sounds; No rebound or guarding  Extremities: Normal range of motion, No clubbing, cyanosis or edema  Neurological: Alert and oriented x3  Skin: Warm and dry. No obvious rash    LABS:                        14.9   5.55  )-----------( 247      ( 21 Jun 2019 08:37 )             45.4     06-21    134<L>  |  100  |  7   ----------------------------<  132<H>  4.0   |  27  |  1.10    Ca    8.7      21 Jun 2019 08:37    TPro  6.5  /  Alb  2.6<L>  /  TBili  0.3  /  DBili  x   /  AST  37  /  ALT  40  /  AlkPhos  34<L>  06-20        PT/INR - ( 21 Jun 2019 09:03 )   PT: 12.1 sec;   INR: 1.07          PTT - ( 21 Jun 2019 10:43 )  PTT:31.7 sec    RADIOLOGY & ADDITIONAL STUDIES:
Hematology Consult Note (Dr Correa)    Pt seen and examined at bedside.     58 M with h/o HIV, HTN, PE's DVT's. Strokes , Polycythemia Vera, Prothrombin Gene on Eliquis mutation presents for slurred speech concerning for TIA/stroke. He is undergoing post TIA evaluation by neurology. Pt is to undergo EGD and LAUREN to eval for right to left shunt assessment. Pt reports 100% compliance with eliquis medication. He takes eliquis 5 mg bid. His symptoms have resolved.  He has hx of injecting testosterone for bodybuilding. Denies any headaches, blurry vision, no chest pain or shortness of breath. no burning/tingling/numbness of fingers/toes. Denies fevers/chills, fatigue, and weight loss. No early satiety.   In the past pt was on eliquis and had another VTE event, but it was found the eliquis was interacting with HIV medication.      Pt endorses feeling back to his normal.   Pt's 12 point ROS negative (20 Jun 2019 23:37)      Allergies    IV contrast (Hives)  No Known Drug Allergies    Intolerances        MEDICATIONS  (STANDING):  atorvastatin 10 milliGRAM(s) Oral at bedtime  dolutegravir 50 milliGRAM(s) Oral at bedtime  enoxaparin Injectable 90 milliGRAM(s) SubCutaneous every 12 hours  losartan 25 milliGRAM(s) Oral two times a day  metoprolol succinate ER 25 milliGRAM(s) Oral two times a day  rilpivirine 25 milliGRAM(s) Oral at bedtime    MEDICATIONS  (PRN):      PAST MEDICAL & SURGICAL HISTORY:  Prothrombin gene mutation  Pulmonary emboli  DVT (deep venous thrombosis)  HIV (human immunodeficiency virus infection)  No significant past surgical history      FAMILY HISTORY:  Family history of CVA  FH: myocardial infarction      SOCIAL HISTORY: No EtOH, no tobacco    REVIEW OF SYSTEMS:    CONSTITUTIONAL: No weakness, fevers or chills  EYES/ENT: No visual changes;  No vertigo or throat pain   NECK: No pain or stiffness  RESPIRATORY: No cough, wheezing, hemoptysis; No shortness of breath  CARDIOVASCULAR: No chest pain or palpitations  GASTROINTESTINAL: No abdominal or epigastric pain. No nausea, vomiting, or hematemesis; No diarrhea or constipation. No melena or hematochezia.  GENITOURINARY: No dysuria, frequency or hematuria  NEUROLOGICAL: No numbness or weakness  SKIN: No itching, burning, rashes, or lesions   All other review of systems is negative unless indicated above.        T(F): 97.9 (06-21-19 @ 13:33), Max: 98.9 (06-20-19 @ 21:52)  HR: 77 (06-21-19 @ 13:33)  BP: 158/87 (06-21-19 @ 13:33)  RR: 16 (06-21-19 @ 13:33)  SpO2: 97% (06-21-19 @ 13:33)  Wt(kg): --    GENERAL: NAD, well-developed  HEAD:  Atraumatic, Normocephalic  EYES: EOMI, PERRLA, conjunctiva and sclera clear  NECK: Supple, No JVD  CHEST/LUNG: Clear to auscultation bilaterally; No wheeze  HEART: Regular rate and rhythm; No murmurs, rubs, or gallops  ABDOMEN: Soft, Nontender, Nondistended; Bowel sounds present  EXTREMITIES:  2+ Peripheral Pulses, No clubbing, cyanosis, or edema  NEUROLOGY: non-focal  SKIN: No rashes or lesions                          14.9   5.55  )-----------( 247      ( 21 Jun 2019 08:37 )             45.4       06-21    134<L>  |  100  |  7   ----------------------------<  132<H>  4.0   |  27  |  1.10    Ca    8.7      21 Jun 2019 08:37    TPro  6.5  /  Alb  2.6<L>  /  TBili  0.3  /  DBili  x   /  AST  37  /  ALT  40  /  AlkPhos  34<L>  06-20

## 2019-06-21 NOTE — PROGRESS NOTE ADULT - PROBLEM SELECTOR PLAN 2
As above.   -Heme/Onc consult.   -Pt sees Dr. Correa outpatient As above.   - Heme/Onc consult.   - Pt sees Dr. Correa outpatient

## 2019-06-21 NOTE — DISCHARGE NOTE PROVIDER - CARE PROVIDERS DIRECT ADDRESSES
[email or web address]

## 2019-06-21 NOTE — DISCHARGE NOTE NURSING/CASE MANAGEMENT/SOCIAL WORK - NSDCDPATPORTLINK_GEN_ALL_CORE
You can access the [facility] Patient Portal, offered by [facility], by registering with the following website: [email or web address]

## 2019-06-21 NOTE — PROGRESS NOTE ADULT - PROBLEM SELECTOR PLAN 6
Pt on Metoprolol Succinate 25mg bid   Losartan 25mg bid. Pt on Metoprolol Succinate 25mg bid and Losartan 25mg bid.  - monitor BP

## 2019-06-21 NOTE — DISCHARGE NOTE PROVIDER - NSDCQMSTATINACONTRAOTHER_GEN_A_CORE_FT
patient without stroke on MRI and w/o occlusion/high grade stenosis on MRA. to follow up OP with Dr. Su.

## 2019-06-21 NOTE — DISCHARGE NOTE PROVIDER - NSDCCPCAREPLAN_GEN_ALL_CORE_FT
PRINCIPAL DISCHARGE DIAGNOSIS  Diagnosis: Brain TIA  Assessment and Plan of Treatment: You were having symptoms of a stroke that lasted around 30 minutes. Stroke symptoms lasting less than 24 hours are also known as a transient ischemic attack. Some of your symptoms were also concerning for specific types of stroke that do not easily show up on CT scan thus special MRI tests were ordered however you decided that you could not wait for these to be done and decided to leave against medical advice. Please make an appointment to see Dr. Su for further workup and recommendations.      SECONDARY DISCHARGE DIAGNOSES  Diagnosis: Prothrombin gene mutation  Assessment and Plan of Treatment: Stop taking eliquis and restart lovenox upon discharge. PRINCIPAL DISCHARGE DIAGNOSIS  Diagnosis: Brain TIA  Assessment and Plan of Treatment: You were having symptoms of a stroke that lasted around 30 minutes. Stroke symptoms lasting less than 24 hours are also known as a transient ischemic attack. Some of your symptoms were also concerning for specific types of stroke that do not easily show up on CT scan thus special MRI tests were ordered. These tests showed no findings of new stroke or blood vessel disease. You had ultrasound of the blood vessels in your neck of which the radiologist is still reviewing. Please make an appointment to see Dr. Su for further workup and recommendations.      SECONDARY DISCHARGE DIAGNOSES  Diagnosis: Prothrombin gene mutation  Assessment and Plan of Treatment: Stop taking eliquis and restart lovenox upon discharge.

## 2019-06-21 NOTE — DISCHARGE NOTE NURSING/CASE MANAGEMENT/SOCIAL WORK - NSDCPEPTSTRK_GEN_ALL_CORE
Risk factors for stroke/Stroke education booklet/Stroke warning signs and symptoms/Call 911 for stroke/Stroke support groups for patients, families, and friends/Signs and symptoms of stroke/Need for follow up after discharge/Prescribed medications

## 2019-06-21 NOTE — CONSULT NOTE ADULT - ASSESSMENT
58 M with h/o HIV, HTN, PE's DVT's. Strokes , Polycythemia Vera, Prothrombin Gene on Eliquis mutation presents for slurred speech concerning for TIA/stroke, undergoing work up      # Polycythemia  - currently Hgb is below threshold to call it polycythemia, (threshold is Hgb >16.5 for men or Hct >45). In past this was likely 2/2 to pt's testosterone and anabolic steroid use. No prior Jak2 testing in records.    # Prothrombin gene  pt allegedly has hx of prothrombin gene mutation testing that was positive at [place] w/ Dr Broderick 2 yrs ago. We have no records of whether he is homozygous or heterozygous. Will complete outpt testing w/ Dr Correa in 2 weeks.   Given his hx of recurrent VTE and recent TIA -like event, pt should be discharged on lovenox 1 mg/kg bid until he sees Dr Correa.     Case to be discussed with Dr Correa.

## 2019-06-21 NOTE — CONSULT NOTE ADULT - ASSESSMENT
This is a 57 YO M with a history of HIV, HTN, PE and DVTs, Polycythemia vera, prothrombin gene on Eliquis who presented for a stroke work up after experiencing 20 minutes of slurred speech. Gastroenterology consulted due to a history of esophageal strictures in preparation for a LAUREN.      1.  History of esophageal stricture     58 year old w/ prothrombin gene mutation here for a stroke work up after 20 minutes of slurred speech on 6/17, with plans to have a LAUREN.  Currently on Lovenox 90 mg BID  History of esophageal strictures since early 20s, s/p 4-5 dilatations. Etiology most likely Achalasia. Would obtain collateral from [facility].  Currently has no complaints of odynophagia, dysphagia with solids or liquids.   Patient ate a meal this afternoon.  Will plan for EGD on 6/24 to r/o strictures. NPO after midnight on Sunday 6/23. This is a 59 YO M with a history of HIV, HTN, PE and DVTs, Polycythemia vera, prothrombin gene on Eliquis who presented for a stroke work up after experiencing 20 minutes of slurred speech. Gastroenterology consulted due to a history of esophageal strictures in preparation for a LAUREN.      1.  History of esophageal stricture     58 year old w/ prothrombin gene mutation here for a stroke work up after 20 minutes of slurred speech on 6/17, with plans to have a LAUREN.  Currently on Lovenox 90 mg BID  History of esophageal strictures since early 20s, s/p 4-5 dilatations. Etiology is unclear. Would obtain collateral from [facility].  Currently has no complaints of odynophagia, dysphagia with solids or liquids.   Patient ate a meal this afternoon.  Will plan for EGD on 6/24 to r/o strictures. NPO after midnight on Sunday 6/23.

## 2019-06-21 NOTE — DISCHARGE NOTE PROVIDER - HOSPITAL COURSE
58M HIV, HTN, PE's DVT's. Strokes , Polycythemia Vera, Prothrombin Gene on Eliquis mutation presented to [facility] for stroke w/u. On Monday in [place] had sx of slurred speech and expressive aphasia lasting 30min. After flying back to [facility], pt was told to have stroke w/u by his PMD thus sent to ED. CT head neg, awaiting MR brain and MRA head along w/ carotid dopplers. Pt had TTE done w/ EF 65% w/o significant valvular findings and w/o wall motion abnormalities. Planned for LAUREN to also look for shunt and atrial appendage clot however held off given h/o esophageal strictures s/p egds w/ dilations thus will need GI clearance. LAUREN planned for possibly to be done OP however would like MR head and MRA head done prior to d/c. Pt upset about waiting for MRI thus would like to leave AMA. 58M HIV, HTN, PE's DVT's. Strokes , Polycythemia Vera, Prothrombin Gene on Eliquis mutation presented to [facility] for stroke w/u. On Monday in [place] had sx of slurred speech and expressive aphasia lasting 30min. After flying back to [facility], pt was told to have stroke w/u by his PMD thus sent to ED. CT head neg, awaiting MR brain and MRA head along w/ carotid dopplers. Pt had TTE done w/ EF 65% w/o significant valvular findings and w/o wall motion abnormalities. Planned for LAUREN to also look for shunt and atrial appendage clot however held off given h/o esophageal strictures s/p egds w/ dilations thus will need GI clearance. LAUREN planned for possibly to be done OP. MR brain and MRA head done showing no acute infarct and w/o signs of occlusion or high grade stenosis. Carotid duplex b/l performed, pending read

## 2019-06-21 NOTE — PROGRESS NOTE ADULT - ASSESSMENT
57 yo M with h/o as above presenting to ED to evaluate for Stroke as pt experienced TIA like symptoms on Monday on Eliquis given pt's medical history

## 2019-06-21 NOTE — PROGRESS NOTE ADULT - PROBLEM SELECTOR PLAN 7
NPO for now.   Heparin Drip scd F: none   E: replete prn  N: NPO for now     DVT: Hep SQ   GI: none.

## 2019-06-21 NOTE — DISCHARGE NOTE PROVIDER - CARE PROVIDER_API CALL
Trini Su)  Neurology; Vascular Neurology  [address], [address] [place] [address]  Phone: [phone]  Fax: [phone]  Follow Up Time: Trini Su)  Neurology; Vascular Neurology  [address], [address] [place] [id number]  Phone: [phone]  Fax: [phone]  Follow Up Time:     Rocio Correa)  Medical Oncology  [address] [address]  Phone: [phone]  Fax: [phone]  Follow Up Time:

## 2019-06-21 NOTE — DISCHARGE NOTE PROVIDER - PROVIDER TOKENS
PROVIDER:[TOKEN:[[id number]]] PROVIDER:[TOKEN:[[id number]]],PROVIDER:[TOKEN:[[id number]]]

## 2019-06-21 NOTE — PROGRESS NOTE ADULT - PROBLEM SELECTOR PLAN 1
Pt with h/o stroke. Found to have Prothrombin gene mutation. Pt had TIA episode of Eliquis.   -MRI brain   -Plan for LAUREN  -Heparin drip goal aPTT 60-80 Pt with h/o stroke. Found to have Prothrombin gene mutation. Pt had TIA episode of Eliquis.   - f/up MRI brain   - LAUREN today   - c/w Heparin drip goal aPTT 60-80  - f/up heme/onc recs Dr. Correa

## 2019-06-25 ENCOUNTER — INBOUND DOCUMENT (OUTPATIENT)
Age: 59
End: 2019-06-25

## 2019-06-26 DIAGNOSIS — Z79.01 LONG TERM (CURRENT) USE OF ANTICOAGULANTS: ICD-10-CM

## 2019-06-26 DIAGNOSIS — G45.9 TRANSIENT CEREBRAL ISCHEMIC ATTACK, UNSPECIFIED: ICD-10-CM

## 2019-06-26 DIAGNOSIS — D68.52 PROTHROMBIN GENE MUTATION: ICD-10-CM

## 2019-06-26 DIAGNOSIS — I10 ESSENTIAL (PRIMARY) HYPERTENSION: ICD-10-CM

## 2019-06-26 DIAGNOSIS — D45 POLYCYTHEMIA VERA: ICD-10-CM

## 2019-06-26 DIAGNOSIS — Z91.19 PATIENT'S NONCOMPLIANCE WITH OTHER MEDICAL TREATMENT AND REGIMEN: ICD-10-CM

## 2019-06-26 DIAGNOSIS — K22.2 ESOPHAGEAL OBSTRUCTION: ICD-10-CM

## 2019-06-26 DIAGNOSIS — Z91.041 RADIOGRAPHIC DYE ALLERGY STATUS: ICD-10-CM

## 2019-06-26 DIAGNOSIS — E78.5 HYPERLIPIDEMIA, UNSPECIFIED: ICD-10-CM

## 2019-06-26 DIAGNOSIS — Z21 ASYMPTOMATIC HUMAN IMMUNODEFICIENCY VIRUS [HIV] INFECTION STATUS: ICD-10-CM

## 2019-07-23 ENCOUNTER — INBOUND DOCUMENT (OUTPATIENT)
Age: 59
End: 2019-07-23

## 2020-11-11 ENCOUNTER — INPATIENT (INPATIENT)
Facility: HOSPITAL | Age: 60
LOS: 0 days | Discharge: LEFT BEFORE DISCHARGE | DRG: 71 | End: 2020-11-12
Attending: INTERNAL MEDICINE | Admitting: INTERNAL MEDICINE
Payer: COMMERCIAL

## 2020-11-11 VITALS
HEIGHT: 69 IN | OXYGEN SATURATION: 96 % | WEIGHT: 195.11 LBS | TEMPERATURE: 98 F | SYSTOLIC BLOOD PRESSURE: 146 MMHG | RESPIRATION RATE: 18 BRPM | DIASTOLIC BLOOD PRESSURE: 82 MMHG | HEART RATE: 137 BPM

## 2020-11-11 DIAGNOSIS — R74.8 ABNORMAL LEVELS OF OTHER SERUM ENZYMES: ICD-10-CM

## 2020-11-11 DIAGNOSIS — D68.52 PROTHROMBIN GENE MUTATION: ICD-10-CM

## 2020-11-11 DIAGNOSIS — G45.9 TRANSIENT CEREBRAL ISCHEMIC ATTACK, UNSPECIFIED: ICD-10-CM

## 2020-11-11 DIAGNOSIS — I26.99 OTHER PULMONARY EMBOLISM WITHOUT ACUTE COR PULMONALE: ICD-10-CM

## 2020-11-11 DIAGNOSIS — I82.409 ACUTE EMBOLISM AND THROMBOSIS OF UNSPECIFIED DEEP VEINS OF UNSPECIFIED LOWER EXTREMITY: ICD-10-CM

## 2020-11-11 DIAGNOSIS — G93.41 METABOLIC ENCEPHALOPATHY: ICD-10-CM

## 2020-11-11 DIAGNOSIS — R63.8 OTHER SYMPTOMS AND SIGNS CONCERNING FOOD AND FLUID INTAKE: ICD-10-CM

## 2020-11-11 DIAGNOSIS — N52.9 MALE ERECTILE DYSFUNCTION, UNSPECIFIED: ICD-10-CM

## 2020-11-11 DIAGNOSIS — I71.9 AORTIC ANEURYSM OF UNSPECIFIED SITE, WITHOUT RUPTURE: ICD-10-CM

## 2020-11-11 DIAGNOSIS — B20 HUMAN IMMUNODEFICIENCY VIRUS [HIV] DISEASE: ICD-10-CM

## 2020-11-11 LAB
ALBUMIN SERPL ELPH-MCNC: 3.7 G/DL — SIGNIFICANT CHANGE UP (ref 3.3–5)
ALBUMIN SERPL ELPH-MCNC: 3.8 G/DL — SIGNIFICANT CHANGE UP (ref 3.4–5)
ALP SERPL-CCNC: 22 U/L — LOW (ref 40–120)
ALP SERPL-CCNC: 24 U/L — LOW (ref 40–120)
ALT FLD-CCNC: 78 U/L — HIGH (ref 10–45)
ALT FLD-CCNC: 97 U/L — HIGH (ref 12–42)
AMPHET UR-MCNC: POSITIVE
ANION GAP SERPL CALC-SCNC: 12 MMOL/L — SIGNIFICANT CHANGE UP (ref 5–17)
ANION GAP SERPL CALC-SCNC: 15 MMOL/L — SIGNIFICANT CHANGE UP (ref 9–16)
ANION GAP SERPL CALC-SCNC: 16 MMOL/L — SIGNIFICANT CHANGE UP (ref 5–17)
APPEARANCE UR: CLEAR — SIGNIFICANT CHANGE UP
APTT BLD: 29.5 SEC — SIGNIFICANT CHANGE UP (ref 27.5–35.5)
APTT BLD: 31.1 SEC — SIGNIFICANT CHANGE UP (ref 27.5–35.5)
AST SERPL-CCNC: 149 U/L — HIGH (ref 10–40)
AST SERPL-CCNC: 170 U/L — HIGH (ref 15–37)
BACTERIA # UR AUTO: SIGNIFICANT CHANGE UP /HPF
BARBITURATES UR SCN-MCNC: NEGATIVE — SIGNIFICANT CHANGE UP
BASOPHILS # BLD AUTO: 0.03 K/UL — SIGNIFICANT CHANGE UP (ref 0–0.2)
BASOPHILS NFR BLD AUTO: 0.3 % — SIGNIFICANT CHANGE UP (ref 0–2)
BENZODIAZ UR-MCNC: NEGATIVE — SIGNIFICANT CHANGE UP
BILIRUB SERPL-MCNC: 1.5 MG/DL — HIGH (ref 0.2–1.2)
BILIRUB SERPL-MCNC: 1.6 MG/DL — HIGH (ref 0.2–1.2)
BILIRUB UR-MCNC: ABNORMAL
BUN SERPL-MCNC: 11 MG/DL — SIGNIFICANT CHANGE UP (ref 7–23)
BUN SERPL-MCNC: 7 MG/DL — SIGNIFICANT CHANGE UP (ref 7–23)
BUN SERPL-MCNC: 7 MG/DL — SIGNIFICANT CHANGE UP (ref 7–23)
CALCIUM SERPL-MCNC: 8.2 MG/DL — LOW (ref 8.4–10.5)
CALCIUM SERPL-MCNC: 8.6 MG/DL — SIGNIFICANT CHANGE UP (ref 8.4–10.5)
CALCIUM SERPL-MCNC: 8.8 MG/DL — SIGNIFICANT CHANGE UP (ref 8.5–10.5)
CHLORIDE SERPL-SCNC: 100 MMOL/L — SIGNIFICANT CHANGE UP (ref 96–108)
CHLORIDE SERPL-SCNC: 101 MMOL/L — SIGNIFICANT CHANGE UP (ref 96–108)
CHLORIDE SERPL-SCNC: 98 MMOL/L — SIGNIFICANT CHANGE UP (ref 96–108)
CK SERPL-CCNC: 3029 U/L — HIGH (ref 30–200)
CK SERPL-CCNC: 5101 U/L — HIGH (ref 39–308)
CO2 SERPL-SCNC: 22 MMOL/L — SIGNIFICANT CHANGE UP (ref 22–31)
CO2 SERPL-SCNC: 23 MMOL/L — SIGNIFICANT CHANGE UP (ref 22–31)
CO2 SERPL-SCNC: 23 MMOL/L — SIGNIFICANT CHANGE UP (ref 22–31)
COCAINE METAB.OTHER UR-MCNC: NEGATIVE — SIGNIFICANT CHANGE UP
COLOR SPEC: YELLOW — SIGNIFICANT CHANGE UP
CREAT SERPL-MCNC: 0.88 MG/DL — SIGNIFICANT CHANGE UP (ref 0.5–1.3)
CREAT SERPL-MCNC: 1.05 MG/DL — SIGNIFICANT CHANGE UP (ref 0.5–1.3)
CREAT SERPL-MCNC: 1.13 MG/DL — SIGNIFICANT CHANGE UP (ref 0.5–1.3)
DIFF PNL FLD: ABNORMAL
EOSINOPHIL # BLD AUTO: 0 K/UL — SIGNIFICANT CHANGE UP (ref 0–0.5)
EOSINOPHIL NFR BLD AUTO: 0 % — SIGNIFICANT CHANGE UP (ref 0–6)
EPI CELLS # UR: SIGNIFICANT CHANGE UP /HPF (ref 0–5)
ETHANOL SERPL-MCNC: <3 MG/DL — SIGNIFICANT CHANGE UP
GLUCOSE SERPL-MCNC: 128 MG/DL — HIGH (ref 70–99)
GLUCOSE SERPL-MCNC: 140 MG/DL — HIGH (ref 70–99)
GLUCOSE SERPL-MCNC: 95 MG/DL — SIGNIFICANT CHANGE UP (ref 70–99)
GLUCOSE UR QL: NEGATIVE — SIGNIFICANT CHANGE UP
HCT VFR BLD CALC: 45.2 % — SIGNIFICANT CHANGE UP (ref 39–50)
HCT VFR BLD CALC: 47.6 % — SIGNIFICANT CHANGE UP (ref 39–50)
HGB BLD-MCNC: 15.5 G/DL — SIGNIFICANT CHANGE UP (ref 13–17)
HGB BLD-MCNC: 16.4 G/DL — SIGNIFICANT CHANGE UP (ref 13–17)
IMM GRANULOCYTES NFR BLD AUTO: 0.3 % — SIGNIFICANT CHANGE UP (ref 0–1.5)
INR BLD: 1.08 — SIGNIFICANT CHANGE UP (ref 0.88–1.16)
INR BLD: 1.24 — HIGH (ref 0.88–1.16)
KETONES UR-MCNC: 15 MG/DL
LEUKOCYTE ESTERASE UR-ACNC: NEGATIVE — SIGNIFICANT CHANGE UP
LYMPHOCYTES # BLD AUTO: 0.55 K/UL — LOW (ref 1–3.3)
LYMPHOCYTES # BLD AUTO: 6.3 % — LOW (ref 13–44)
MAGNESIUM SERPL-MCNC: 1.3 MG/DL — LOW (ref 1.6–2.6)
MAGNESIUM SERPL-MCNC: 1.8 MG/DL — SIGNIFICANT CHANGE UP (ref 1.6–2.6)
MAGNESIUM SERPL-MCNC: 2.2 MG/DL — SIGNIFICANT CHANGE UP (ref 1.6–2.6)
MANUAL SMEAR VERIFICATION: SIGNIFICANT CHANGE UP
MCHC RBC-ENTMCNC: 32.3 PG — SIGNIFICANT CHANGE UP (ref 27–34)
MCHC RBC-ENTMCNC: 32.6 PG — SIGNIFICANT CHANGE UP (ref 27–34)
MCHC RBC-ENTMCNC: 34.3 GM/DL — SIGNIFICANT CHANGE UP (ref 32–36)
MCHC RBC-ENTMCNC: 34.5 GM/DL — SIGNIFICANT CHANGE UP (ref 32–36)
MCV RBC AUTO: 93.7 FL — SIGNIFICANT CHANGE UP (ref 80–100)
MCV RBC AUTO: 95 FL — SIGNIFICANT CHANGE UP (ref 80–100)
METHADONE UR-MCNC: NEGATIVE — SIGNIFICANT CHANGE UP
MONOCYTES # BLD AUTO: 0.67 K/UL — SIGNIFICANT CHANGE UP (ref 0–0.9)
MONOCYTES NFR BLD AUTO: 7.6 % — SIGNIFICANT CHANGE UP (ref 2–14)
NEUTROPHILS # BLD AUTO: 7.48 K/UL — HIGH (ref 1.8–7.4)
NEUTROPHILS NFR BLD AUTO: 85.5 % — HIGH (ref 43–77)
NITRITE UR-MCNC: NEGATIVE — SIGNIFICANT CHANGE UP
NRBC # BLD: 0 /100 WBCS — SIGNIFICANT CHANGE UP (ref 0–0)
NRBC # BLD: 0 /100 WBCS — SIGNIFICANT CHANGE UP (ref 0–0)
NT-PROBNP SERPL-SCNC: 369 PG/ML — HIGH
OPIATES UR-MCNC: NEGATIVE — SIGNIFICANT CHANGE UP
PCP SPEC-MCNC: SIGNIFICANT CHANGE UP
PCP UR-MCNC: NEGATIVE — SIGNIFICANT CHANGE UP
PH UR: 6 — SIGNIFICANT CHANGE UP (ref 5–8)
PHOSPHATE SERPL-MCNC: 2.6 MG/DL — SIGNIFICANT CHANGE UP (ref 2.5–4.5)
PLAT MORPH BLD: NORMAL — SIGNIFICANT CHANGE UP
PLATELET # BLD AUTO: 136 K/UL — LOW (ref 150–400)
PLATELET # BLD AUTO: 141 K/UL — LOW (ref 150–400)
POTASSIUM SERPL-MCNC: 3.3 MMOL/L — LOW (ref 3.5–5.3)
POTASSIUM SERPL-MCNC: 3.8 MMOL/L — SIGNIFICANT CHANGE UP (ref 3.5–5.3)
POTASSIUM SERPL-MCNC: 4.9 MMOL/L — SIGNIFICANT CHANGE UP (ref 3.5–5.3)
POTASSIUM SERPL-SCNC: 3.3 MMOL/L — LOW (ref 3.5–5.3)
POTASSIUM SERPL-SCNC: 3.8 MMOL/L — SIGNIFICANT CHANGE UP (ref 3.5–5.3)
POTASSIUM SERPL-SCNC: 4.9 MMOL/L — SIGNIFICANT CHANGE UP (ref 3.5–5.3)
PROT SERPL-MCNC: 6.7 G/DL — SIGNIFICANT CHANGE UP (ref 6–8.3)
PROT SERPL-MCNC: 7.7 G/DL — SIGNIFICANT CHANGE UP (ref 6.4–8.2)
PROT UR-MCNC: NEGATIVE MG/DL — SIGNIFICANT CHANGE UP
PROTHROM AB SERPL-ACNC: 12.9 SEC — SIGNIFICANT CHANGE UP (ref 10.6–13.6)
PROTHROM AB SERPL-ACNC: 14.5 SEC — HIGH (ref 10.6–13.6)
RBC # BLD: 4.76 M/UL — SIGNIFICANT CHANGE UP (ref 4.2–5.8)
RBC # BLD: 5.08 M/UL — SIGNIFICANT CHANGE UP (ref 4.2–5.8)
RBC # FLD: 13.8 % — SIGNIFICANT CHANGE UP (ref 10.3–14.5)
RBC # FLD: 14 % — SIGNIFICANT CHANGE UP (ref 10.3–14.5)
RBC BLD AUTO: NORMAL — SIGNIFICANT CHANGE UP
RBC CASTS # UR COMP ASSIST: < 5 /HPF — SIGNIFICANT CHANGE UP
SARS-COV-2 RNA SPEC QL NAA+PROBE: SIGNIFICANT CHANGE UP
SODIUM SERPL-SCNC: 136 MMOL/L — SIGNIFICANT CHANGE UP (ref 135–145)
SODIUM SERPL-SCNC: 137 MMOL/L — SIGNIFICANT CHANGE UP (ref 132–145)
SODIUM SERPL-SCNC: 137 MMOL/L — SIGNIFICANT CHANGE UP (ref 135–145)
SP GR SPEC: 1.02 — SIGNIFICANT CHANGE UP (ref 1–1.03)
THC UR QL: NEGATIVE — SIGNIFICANT CHANGE UP
TROPONIN I SERPL-MCNC: 0.1 NG/ML — HIGH (ref 0.02–0.06)
TROPONIN T SERPL-MCNC: <0.01 NG/ML — SIGNIFICANT CHANGE UP (ref 0–0.01)
UROBILINOGEN FLD QL: 0.2 E.U./DL — SIGNIFICANT CHANGE UP
WBC # BLD: 8.06 K/UL — SIGNIFICANT CHANGE UP (ref 3.8–10.5)
WBC # BLD: 8.76 K/UL — SIGNIFICANT CHANGE UP (ref 3.8–10.5)
WBC # FLD AUTO: 8.06 K/UL — SIGNIFICANT CHANGE UP (ref 3.8–10.5)
WBC # FLD AUTO: 8.76 K/UL — SIGNIFICANT CHANGE UP (ref 3.8–10.5)
WBC UR QL: < 5 /HPF — SIGNIFICANT CHANGE UP

## 2020-11-11 PROCEDURE — 93010 ELECTROCARDIOGRAM REPORT: CPT

## 2020-11-11 PROCEDURE — 99220: CPT | Mod: CR

## 2020-11-11 PROCEDURE — 99232 SBSQ HOSP IP/OBS MODERATE 35: CPT

## 2020-11-11 PROCEDURE — 71045 X-RAY EXAM CHEST 1 VIEW: CPT | Mod: 26

## 2020-11-11 PROCEDURE — 70450 CT HEAD/BRAIN W/O DYE: CPT | Mod: 26

## 2020-11-11 PROCEDURE — 99291 CRITICAL CARE FIRST HOUR: CPT

## 2020-11-11 RX ORDER — LOSARTAN POTASSIUM 100 MG/1
1 TABLET, FILM COATED ORAL
Qty: 0 | Refills: 0 | DISCHARGE

## 2020-11-11 RX ORDER — DOLUTEGRAVIR SODIUM AND RILPIVIRINE HYDROCHLORIDE 50; 25 MG/1; MG/1
1 TABLET, FILM COATED ORAL
Qty: 0 | Refills: 0 | DISCHARGE

## 2020-11-11 RX ORDER — SODIUM CHLORIDE 9 MG/ML
1000 INJECTION INTRAMUSCULAR; INTRAVENOUS; SUBCUTANEOUS ONCE
Refills: 0 | Status: COMPLETED | OUTPATIENT
Start: 2020-11-11 | End: 2020-11-11

## 2020-11-11 RX ORDER — FUROSEMIDE 40 MG
20 TABLET ORAL DAILY
Refills: 0 | Status: DISCONTINUED | OUTPATIENT
Start: 2020-11-11 | End: 2020-11-11

## 2020-11-11 RX ORDER — DIPHENHYDRAMINE HCL 50 MG
50 CAPSULE ORAL ONCE
Refills: 0 | Status: COMPLETED | OUTPATIENT
Start: 2020-11-11 | End: 2020-11-11

## 2020-11-11 RX ORDER — FOLIC ACID 0.8 MG
1 TABLET ORAL DAILY
Refills: 0 | Status: DISCONTINUED | OUTPATIENT
Start: 2020-11-11 | End: 2020-11-12

## 2020-11-11 RX ORDER — HALOPERIDOL DECANOATE 100 MG/ML
5 INJECTION INTRAMUSCULAR ONCE
Refills: 0 | Status: DISCONTINUED | OUTPATIENT
Start: 2020-11-11 | End: 2020-11-11

## 2020-11-11 RX ORDER — ZOLPIDEM TARTRATE 10 MG/1
12.5 TABLET ORAL
Qty: 0 | Refills: 0 | DISCHARGE

## 2020-11-11 RX ORDER — THIAMINE MONONITRATE (VIT B1) 100 MG
100 TABLET ORAL DAILY
Refills: 0 | Status: DISCONTINUED | OUTPATIENT
Start: 2020-11-11 | End: 2020-11-12

## 2020-11-11 RX ORDER — SODIUM CHLORIDE 9 MG/ML
1000 INJECTION INTRAMUSCULAR; INTRAVENOUS; SUBCUTANEOUS
Refills: 0 | Status: DISCONTINUED | OUTPATIENT
Start: 2020-11-11 | End: 2020-11-11

## 2020-11-11 RX ORDER — METOPROLOL TARTRATE 50 MG
25 TABLET ORAL EVERY 12 HOURS
Refills: 0 | Status: DISCONTINUED | OUTPATIENT
Start: 2020-11-11 | End: 2020-11-12

## 2020-11-11 RX ORDER — MIDAZOLAM HYDROCHLORIDE 1 MG/ML
5 INJECTION, SOLUTION INTRAMUSCULAR; INTRAVENOUS ONCE
Refills: 0 | Status: DISCONTINUED | OUTPATIENT
Start: 2020-11-11 | End: 2020-11-11

## 2020-11-11 RX ORDER — MAGNESIUM SULFATE 500 MG/ML
2 VIAL (ML) INJECTION ONCE
Refills: 0 | Status: COMPLETED | OUTPATIENT
Start: 2020-11-11 | End: 2020-11-11

## 2020-11-11 RX ORDER — ATORVASTATIN CALCIUM 80 MG/1
10 TABLET, FILM COATED ORAL AT BEDTIME
Refills: 0 | Status: DISCONTINUED | OUTPATIENT
Start: 2020-11-11 | End: 2020-11-12

## 2020-11-11 RX ORDER — SODIUM CHLORIDE 9 MG/ML
1000 INJECTION INTRAMUSCULAR; INTRAVENOUS; SUBCUTANEOUS
Refills: 0 | Status: DISCONTINUED | OUTPATIENT
Start: 2020-11-11 | End: 2020-11-12

## 2020-11-11 RX ORDER — APIXABAN 2.5 MG/1
5 TABLET, FILM COATED ORAL EVERY 12 HOURS
Refills: 0 | Status: DISCONTINUED | OUTPATIENT
Start: 2020-11-11 | End: 2020-11-11

## 2020-11-11 RX ORDER — POTASSIUM CHLORIDE 20 MEQ
40 PACKET (EA) ORAL EVERY 4 HOURS
Refills: 0 | Status: COMPLETED | OUTPATIENT
Start: 2020-11-11 | End: 2020-11-11

## 2020-11-11 RX ORDER — MAGNESIUM SULFATE 500 MG/ML
2 VIAL (ML) INJECTION
Refills: 0 | Status: COMPLETED | OUTPATIENT
Start: 2020-11-11 | End: 2020-11-11

## 2020-11-11 RX ORDER — LOSARTAN POTASSIUM 100 MG/1
100 TABLET, FILM COATED ORAL DAILY
Refills: 0 | Status: DISCONTINUED | OUTPATIENT
Start: 2020-11-11 | End: 2020-11-12

## 2020-11-11 RX ORDER — HALOPERIDOL DECANOATE 100 MG/ML
5 INJECTION INTRAMUSCULAR ONCE
Refills: 0 | Status: COMPLETED | OUTPATIENT
Start: 2020-11-11 | End: 2020-11-11

## 2020-11-11 RX ADMIN — Medication 40 MILLIEQUIVALENT(S): at 18:02

## 2020-11-11 RX ADMIN — Medication 40 MILLIEQUIVALENT(S): at 21:03

## 2020-11-11 RX ADMIN — Medication 1 TABLET(S): at 16:09

## 2020-11-11 RX ADMIN — SODIUM CHLORIDE 1000 MILLILITER(S): 9 INJECTION INTRAMUSCULAR; INTRAVENOUS; SUBCUTANEOUS at 08:59

## 2020-11-11 RX ADMIN — Medication 1 MILLIGRAM(S): at 16:09

## 2020-11-11 RX ADMIN — Medication 40 MILLIGRAM(S): at 22:43

## 2020-11-11 RX ADMIN — Medication 50 MILLIGRAM(S): at 17:59

## 2020-11-11 RX ADMIN — Medication 50 GRAM(S): at 17:59

## 2020-11-11 RX ADMIN — HALOPERIDOL DECANOATE 5 MILLIGRAM(S): 100 INJECTION INTRAMUSCULAR at 12:40

## 2020-11-11 RX ADMIN — Medication 25 MILLIGRAM(S): at 17:59

## 2020-11-11 RX ADMIN — Medication 2 MILLIGRAM(S): at 11:10

## 2020-11-11 RX ADMIN — SODIUM CHLORIDE 125 MILLILITER(S): 9 INJECTION INTRAMUSCULAR; INTRAVENOUS; SUBCUTANEOUS at 17:12

## 2020-11-11 RX ADMIN — Medication 50 MILLIGRAM(S): at 22:43

## 2020-11-11 RX ADMIN — MIDAZOLAM HYDROCHLORIDE 5 MILLIGRAM(S): 1 INJECTION, SOLUTION INTRAMUSCULAR; INTRAVENOUS at 12:40

## 2020-11-11 RX ADMIN — Medication 50 GRAM(S): at 10:13

## 2020-11-11 RX ADMIN — Medication 100 MILLIGRAM(S): at 16:09

## 2020-11-11 RX ADMIN — ATORVASTATIN CALCIUM 10 MILLIGRAM(S): 80 TABLET, FILM COATED ORAL at 21:03

## 2020-11-11 RX ADMIN — Medication 50 GRAM(S): at 17:14

## 2020-11-11 RX ADMIN — Medication 40 MILLIGRAM(S): at 17:59

## 2020-11-11 RX ADMIN — SODIUM CHLORIDE 1000 MILLILITER(S): 9 INJECTION INTRAMUSCULAR; INTRAVENOUS; SUBCUTANEOUS at 10:13

## 2020-11-11 RX ADMIN — Medication 20 MILLIGRAM(S): at 13:39

## 2020-11-11 RX ADMIN — Medication 2 MILLIGRAM(S): at 11:45

## 2020-11-11 NOTE — H&P ADULT - NSHPSOCIALHISTORY_GEN_ALL_CORE
denies drug use  alcohol use 3-4x/wk - 1-2 cocktails at a time  denies tobacco use  lives at home with partner in [place], "mostly" monogamous (MSM)

## 2020-11-11 NOTE — ED PROVIDER NOTE - PROGRESS NOTE DETAILS
Labs, EKG, urine, and neuro imaging reviewed. Patient with hypomagnesemia, mild rabdomiolisis, and mild transaminitis. Drug urine screening with +amphetamines. Negative for alcohol. CT unremarkable. Slightly more lucid now. Now A&O x 3. Seems very fidgety and anxious. CIWA score of 5. Patient states his last drink was 9:30pm last night. Endorses using homeopathic medications but no weight loss or dietary supplements. He has not recently increased his alcohol use and states he drinks 3-4 time a week. Denies history of withdrawal symptoms. Patient remains tachycardiac, but blood pressure improved. Magnesium replaced, IV hydration given. Will order Ativan for management with symptoms and contact partner for collateral. Patient states he has not been tested for COVID, so will order COVID testing for admission purposes. Will initiate observation at this time. Labs, EKG, urine, and neuro imaging reviewed. Patient with hypomagnesemia, mild rhabdomyolysis, and mild transaminitis. Drug urine screening with +amphetamines. Negative for alcohol. CT brain unremarkable. Slightly more lucid now. Now A&O x 3. Seems very fidgety and anxious. CIWA score of 5. Patient states his last drink was 9:30pm last night. Endorses using homeopathic medications but no weight loss or dietary supplements. Compliant with blood pressure medications (metoprolol) and HIV medications.  He has not recently increased his alcohol use and states he drinks 3-4 time a week. Denies history of withdrawal symptoms. Patient remains tachycardiac, but blood pressure improved. Magnesium replaced, IV hydration given. Will order Ativan for management with symptoms and contact partner for collateral. Patient states he has not been tested for COVID, so will order COVID testing for admission purposes. Will initiate observation at this time for serial neurological exam and monitoring.  AMS likely due to polysubstance vs alcohol withdrawal.

## 2020-11-11 NOTE — ED ADULT NURSE NOTE - CHIEF COMPLAINT QUOTE
BIBA after  walking around without clothes or shoes. reports heavy ETOH use last night with percocet and ambien. +abrasion to right hand. tetanus <5 years. h/o HTN (hasn't taken his meds in 1 week) and DVT (hasn't taken on meds in 1 month.  in the field.

## 2020-11-11 NOTE — H&P ADULT - ASSESSMENT
Pt is a 60 yo M with PMH HTN, DVT (2014), PE (2015), HIV, polycythemia vera, prothrombin gene mutation, and AAA who p/w AMS. Admitted to  for further observation and management. Pt is a 58 yo M with PMH HTN, DVT (2014), PE (2015), HIV, polycythemia vera, prothrombin gene mutation, and aortic root aneurysm (2016) who p/w AMS. Admitted to  for further observation and management. Pt is a 58 yo M with PMH HTN, DVT (2014), PE (2015), HIV, polycythemia vera, prothrombin gene mutation, and aortic root aneurysm (2016) who p/w AMS, likely in the setting of recent alcohol abuse. Admitted to  for further observation and management.

## 2020-11-11 NOTE — ED CDU PROVIDER DISPOSITION NOTE - CLINICAL COURSE
Remains tremulous and disoriented. Additional sedation ordered. Placed on 1:1 due to elopement and fall risk. Patient's troponin is elevated likely due to persistent tachycardia. Denies chest pain. No ischemic EKG changes. Rectal temperature obtained and WNL.  Will admit to Tele Stepdown at [facility] for delirium likely due to alcohol withdrawal vs acute sympathomimetic use.  COVID result pending.  D/w Dr. Amaya.  Protecting airway.  Stable for transfer to higher level of care. Remains tremulous and disoriented. Additional sedation ordered. Placed on 1:1 due to elopement and fall risk. Patient's troponin is elevated likely due to persistent tachycardia. Denies chest pain. No ischemic EKG changes. Rectal temperature obtained and WNL.  Will admit to Tele Stepdown at [facility] for delirium likely due to alcohol withdrawal vs acute sympathomimetic use.  COVID result pending.  D/w Dr. Amaya.  Protecting airway.  Stable for transfer to higher level of care.    130 pm - CXR reviewed and evidence of failure.  Bedside echo with normal appearing EF and no large pericardial effusion.  BNP mildly elevated.  BP/HR remain elevated.  IV lasxi ordered.  Decreased maintenance fluids to 100cc/hr.  Stable for transfer.

## 2020-11-11 NOTE — ED ADULT NURSE NOTE - NSIMPLEMENTINTERV_GEN_ALL_ED
Implemented All Universal Safety Interventions:  [place] to call system. Call bell, personal items and telephone within reach. Instruct patient to call for assistance. Room bathroom lighting operational. Non-slip footwear when patient is off stretcher. Physically safe environment: no spills, clutter or unnecessary equipment. Stretcher in lowest position, wheels locked, appropriate side rails in place. Implemented All Fall Risk Interventions:  [place] to call system. Call bell, personal items and telephone within reach. Instruct patient to call for assistance. Room bathroom lighting operational. Non-slip footwear when patient is off stretcher. Physically safe environment: no spills, clutter or unnecessary equipment. Stretcher in lowest position, wheels locked, appropriate side rails in place. Provide visual cue, wrist band, yellow gown, etc. Monitor gait and stability. Monitor for mental status changes and reorient to person, place, and time. Review medications for side effects contributing to fall risk. Reinforce activity limits and safety measures with patient and family.

## 2020-11-11 NOTE — ED PROVIDER NOTE - CONSTITUTIONAL, MLM
normal... Sleepy and somnolent. Slurred speech but responds to questions slowly. Sleepy and somnolent. Slurred speech, responds to questions slowly.

## 2020-11-11 NOTE — CHART NOTE - NSCHARTNOTEFT_GEN_A_CORE
Cardiology called to perform Echocardiogram for patient with widened mediastum.     d/t technical issues the study was not uploaded to the chart yet, however, the images were reviewed by Dr. López and I, and will be uploaded once problem resolved.     There was normal Biventricular function  mild Aortic insufficieny  mildly dilated aortic root

## 2020-11-11 NOTE — ED ADULT NURSE NOTE - CHIEF COMPLAINT
The patient is a 59y Male complaining of altered mental status. BIBA after  walking around without clothes or shoes. reports heavy ETOH use last night with percocet and ambien. +abrasion to right hand. tetanus <5 years. h/o HTN (hasn't taken his meds in 1 week) and DVT (hasn't taken on meds in 1 month.  in the field.

## 2020-11-11 NOTE — ED CDU PROVIDER INITIAL DAY NOTE - MEDICAL DECISION MAKING DETAILS
Placed on Observation with persistent delirium. CIWA 5 prior to IV ativan.  Needed additional sedation and 1:1 observation.  Reviewed with Dr. Amaya and accepted to Tele Stepdown.

## 2020-11-11 NOTE — ED ADULT TRIAGE NOTE - CHIEF COMPLAINT QUOTE
BIBA after  walking around without clothes or shoes. reports heavy ETOH use last night with percocet and ambien. +abrasion to right hand. tetanus <5 years. h/o HTN (hasn't taken his meds in 1 week) and DVT (hasn't taken on meds in 1 month. BIBA after  walking around without clothes or shoes. reports heavy ETOH use last night with percocet and ambien. +abrasion to right hand. tetanus <5 years. h/o HTN (hasn't taken his meds in 1 week) and DVT (hasn't taken on meds in 1 month.  in the field.

## 2020-11-11 NOTE — ED PROVIDER NOTE - OBJECTIVE STATEMENT
58 y/o male with PMHx of HIV (unknown CD4 count but reports is undetectable), DVT and PE (on Eliquis), BIB EMS after he was found wandering in a hotel scantily clad. As per EMS, Patient was cooperative, tachycardiac, and with dilated pupils and had taken Ambiem, Percocet, and alcohol. As per Patient, he does not fully recall but reports he was at a party with friends celebrating a birthday and consumed a lot of alcohol. Denies headache, dizziness, or chest pain. Denies cocaine or methamphetamine use. 58 y/o male with PMHx of HTN, HIV (unknown CD4 count but reports is undetectable), DVT and PE (on Eliquis), Polycythemia Vera, Prothrombin Gene mutation, BIB EMS after he was found wandering in a hotel scantily clad. As per EMS, Patient was cooperative, tachycardiac, and with dilated pupils and had taken Ambiem, Percocet, and alcohol. As per Patient, he does not fully recall but reports he was at a party with friends celebrating a birthday and consumed a lot of alcohol. Denies headache, dizziness, or chest pain. Denies cocaine or methamphetamine use.    Polycythemia Vera, Prothrombin Gene mutation

## 2020-11-11 NOTE — ED PROVIDER NOTE - NEUROLOGICAL, MLM
Slow speech. Motor intact 5/5 in upper and lower extremities. No truncal ataxia. No facial asymmetry.

## 2020-11-11 NOTE — CONSULT NOTE ADULT - SUBJECTIVE AND OBJECTIVE BOX
Patient is a 59y old  Male who presents with a chief complaint of AMS (11 Nov 2020 15:36)      HPI:  Pt is a 60 yo M with PMH HTN, DVT (2014), PE (2015), HIV, polycythemia vera, prothrombin gene mutation, and aortic root aneurysm (2016) who p/w AMS. Was found wandering without clothes/shoes in a hotel and EMS called to bring pt to hospital. Admitted to heavy alcohol use the evening prior at a birthday party along with percocet and Ambien use. Does not recall entirety of events. Denies drug use and states he drinks alcohol 3-4x/wk without any history of withdrawal in the past. Per EMS, pt was confused, had slurred speech and dilated pupils, and intermittently somnolent though AOx3. Also agitated and attempting to leave the ED at times. Pt's PCP manages HIV meds and is based in [place], seen earlier this year and typically 2x/y. Pt also follows with cardiology and heme-onc at [facility]. Dx with aortic root aneurysm 2016 and last checked 9/2020 and "stable". Has not taken anti-HTN, eliquis, or HAART in 2-3wks (out of meds). Has not had phlebotomy for PV in "a while". Regarding AC, was initially on eliquis but then there was c/f repeat clot while on eliquis and pt transitioned to lovenox spring 2019, recently was switched back to eliquis as hematology believed the clot was residual and not acute. Also says he often injects trimex into his penis to allow for "firmer" erections, most recently evening prior to arrival and has chronic erythema/swelling at the site. Obtains Rx from ?online physician and pharmacy. At present, no other complaints.    On arrival, afebrile, , /82, RR 18 O2sat 96% RA. Labs with WBC 8.76, neutrophils 85.5%, plt 141, PT 14.5, INR 1.24, PTT 29.5, K 3.8, glu 140, Mg 1.3, T bili 1.6, , ALT 97, CK 5101, trop 0.1, . UDS +amphetamines, serum alcohol neg. COVID neg. Pt given 1L NS bolus x2 and started on 150cc/h mIVF. Given 2g Mg, ativan 2mg x2, midazolam 5mg, haldol 5mg. CTH neg. CXR without acute infiltrate.     Of note, per chart review: imaging from 1/2016 showing BL PEs and 3/2016 PE's resolved but cardiomegaly with mild pulmonary artery dilation c/f pulmonary HTN. (11 Nov 2020 15:36)                                      -+-+-+-+-+-+-+-= NoTe=+-+-+-+-+-+        Patient is as stated above,  team consulted due to patient injecting himself w/ Prostaglandin.  Patient endorses Erectile Dysfunction and states that he has been injecting himself w/ Prostaglandins,  he follows a urologist in [place], does not recall name.  He states he has never had any issues with Injecting himself and that his penis appears normal to him.  He states it usually appears like that, soft, mildly edematous, and erythematous.  He denies n/v/f/c, dysuria, hematuria, pain to shaft or b/l testes, LUTS,     Vital Signs Last 24 Hrs  T(C): 36.3 (11 Nov 2020 15:03), Max: 36.9 (11 Nov 2020 08:06)  T(F): 97.4 (11 Nov 2020 15:03), Max: 98.5 (11 Nov 2020 08:06)  HR: 108 (11 Nov 2020 17:15) (88 - 137)  BP: 154/70 (11 Nov 2020 17:15) (146/82 - 170/70)  BP(mean): 99 (11 Nov 2020 15:03) (99 - 99)  RR: 20 (11 Nov 2020 17:15) (16 - 22)  SpO2: 94% (11 Nov 2020 17:15) (92% - 97%)  I&O's Summary    11 Nov 2020 07:01  -  11 Nov 2020 17:49  --------------------------------------------------------  IN: 0 mL / OUT: 925 mL / NET: -925 mL        PE:  Gen: NAD, alert and oriented x 3   Abd: soft nt/nd. negative CVAT B/L  : Penis appears erythematous, mildly edematous.  Negative TTP to shaft and b/l testes.  Negative discharge noted or blood.  Injection site noted w/ no oozing or discharge.          Patient penis able to be bent 90 degrees w/ no tenderness to palpation, circumcised phallus.   BAKARI: Deferred.    LABS:                        15.5   8.06  )-----------( 136      ( 11 Nov 2020 17:26 )             45.2     11-11    137  |  100  |  11  ----------------------------<  140<H>  3.8   |  22  |  1.13    Ca    8.8      11 Nov 2020 08:46  Mg     1.3     11-11    TPro  7.7  /  Alb  3.8  /  TBili  1.6<H>  /  DBili  x   /  AST  170<H>  /  ALT  97<H>  /  AlkPhos  24<L>  11-11    PT/INR - ( 11 Nov 2020 17:26 )   PT: 12.9 sec;   INR: 1.08          PTT - ( 11 Nov 2020 17:26 )  PTT:31.1 sec  Cultures      A/P

## 2020-11-11 NOTE — ED ADULT NURSE NOTE - CHPI ED NUR SYMPTOMS NEG
no tingling/no decreased eating/drinking/no fever/no nausea/no vomiting/no weakness/no dizziness/no pain/no chills

## 2020-11-11 NOTE — ED CDU PROVIDER INITIAL DAY NOTE - OBJECTIVE STATEMENT
60 y/o male with PMHx of HIV (unknown CD4 count but reports is undetectable), DVT and PE (on Eliquis), BIB EMS after he was found wandering in a hotel scantily clad. As per EMS, Patient was cooperative, tachycardiac, and with dilated pupils and had taken Ambiem, Percocet, and alcohol. As per Patient, he does not fully recall but reports he was at a party with friends celebrating a birthday and consumed a lot of alcohol. Denies headache, dizziness, or chest pain. Denies cocaine or methamphetamine use.

## 2020-11-11 NOTE — ED PROVIDER NOTE - CLINICAL SUMMARY MEDICAL DECISION MAKING FREE TEXT BOX
58 y/o male BIB EMS after being found confused in a hotel. Endorses alcohol use, history of DVT and PE on Eliquis, and history of HIV. On arrival, Patient was hypertensive and tachycardiac. As per EMS, Patient has been off his hypertensive medications x 1 week. Denies chest pain, dizziness, headache, or palpitations. Patient alert and oriented x 2. Sleepy and somnolent with slurred speech. 60 y/o male BIB EMS after being found confused in a hotel. Endorses alcohol use, history of DVT and PE on Eliquis, and history of HIV. On arrival, Patient was hypertensive and tachycardiac. As per EMS, Patient has been off his hypertensive medications x 1 week. Denies chest pain, dizziness, headache, or palpitations. Patient alert and oriented x 2. Sleepy and somnolent with slurred speech, otherwise, nonfocal neurological exam. Pupils 4mm, sluggish. No track marks. No signs of head, extremity, or thoracic trauma. Will order EKG, neuro imaging, blood work, fluids, and urine.

## 2020-11-11 NOTE — H&P ADULT - PROBLEM SELECTOR PLAN 7
-Testostron 200mg injectable 1ml every 2 weeks  -Trimix injaction    # Genital cellulitis  -Penis erythema, no leukocytosis, afabrile  -Likely in setting of genital trimix injaction  -Urology consult -Testostron 200mg injectable 1ml every 2 weeks  -Trimix injaction     # r/o Genital cellulitis  -Penis erythema, swelling,, no leukocytosis, afabrile  -Likely in setting of genital trimix injection  -Urology consult - pt admits to testosterone injections 200mg 1mL q2 wks and trimix penile injections to aid in "firmer" erections   - on exam, penis flaccid, erythematous, and swollen though non-tender with injection site visible and non-infectious in appearance  - pt denies pain or urinary symptoms  - f/u UA, UCx  - urology consulted, f/u recs

## 2020-11-11 NOTE — PATIENT PROFILE ADULT - STATED REASON FOR ADMISSION
Patient had dinner and drank with teammates tuesday night. Patient blacked out and ended up in emergency room. Last thing patient recalls is having dinner and drinks with teammates Tuesday night. Patient blacked out and ended up in emergency room on Wednesday.

## 2020-11-11 NOTE — PROVIDER CONTACT NOTE (OTHER) - ACTION/TREATMENT ORDERED:
EKG. Magnesium 2mg x2. EKG done, noted with prolonged QTc. Labs drawn. Magnesium 2mg x2 administered.

## 2020-11-11 NOTE — H&P ADULT - ATTENDING COMMENTS
Pt clinically improved and now awake and alert. Will follow up with CT angio with hx of aortic aneurysm as well as EKG with prolonged QT. Replace  Mg.

## 2020-11-11 NOTE — H&P ADULT - PROBLEM SELECTOR PLAN 2
Serum CK 5101 , Trop 0.1,   ECG: Sinus tachy, RBBB  -Likely due to rhabdomyolysis  - s/p 2L Ns in ED  - c/w NS 125cc/hr   - f/u CK - on arrival, CK 5101   - likely in the setting of recent alcohol/substance abuse with pt admitting to periods of memory loss c/f possible periods of being down  - EKG on arrival with sinus tach and RBBB   - on exam, no muscle tenderness   - s/p 2L NS bolus in ED, continued on mIVF 125cc/h   - f/u 8pm CK    #Hypomagnesemia  - Mg 1.3 on arrival, s/p 2g IV Mg in ED  - will dose 2x 2mg Mg and repeat Mg level 8p  - continue to monitor and replete PRN    #Hypokalemia  - on arrival, K 3.3  - will dose 2x 40mg K PO  - repeat BMP 8p - replete PRN - on arrival, CK 5101   - likely in the setting of recent alcohol/substance abuse with pt admitting to periods of memory loss c/f possible periods of being down  - EKG on arrival with sinus tach and RBBB   - on exam, no muscle tenderness   - s/p 2L NS bolus in ED, continued on mIVF 125cc/h   - f/u 8pm CK    #Hypomagnesemia  - Mg 1.3 on arrival, s/p 2g IV Mg in ED  - likely 2/2 poor nutritional intake and alcohol use  - will dose 2x 2mg Mg and repeat Mg level 8p  - continue to monitor and replete PRN    #Hypokalemia  - on arrival, K 3.3  - likely 2/2 poor nutritional intake and alcohol use  - will dose 2x 40mg K PO  - repeat BMP 8p - replete PRN

## 2020-11-11 NOTE — H&P ADULT - PROBLEM SELECTOR PLAN 5
Denies SOB, chest pain  - Was on Eliquis   -Off from ELiquis for 2 weeks Denies SOB, chest pain  -  was initially on eliquis, after repeat clot while on eliquis, pt transitioned to lovenox spring 2019, recently was switched back to eliquis as hematology believed the clot was residual  -Off from ELiquis for 2 weeks  -Hold on Eliquis for now due to Aortic aneurysm - as above    #Alcohol abuse  - pt presenting after binge drinking with intermittent memory loss c/f unknown amount of alcohol or substance intake  - admits to drinking alcohol 3-4x/wk  - alcohol level neg on arrival  - UDS +amphetamines  - CIWA 5 @ [facility], CIWA 0 on arrival to [facility]  - c/w CIWA protocol with 1mg ativan q2h for CIWA>8 - as above    #Alcohol abuse  - pt presenting after binge drinking with intermittent memory loss c/f unknown amount of alcohol or substance intake  - admits to drinking alcohol 3-4x/wk  - on arrival, AST 2x greater than ALT c/w alcohol abuse  - alcohol level neg on arrival  - UDS +amphetamines  - CIWA 5 @ [facility], CIWA 0 on arrival to [facility]  - c/w CIWA protocol with 1mg ativan q2h for CIWA>8

## 2020-11-11 NOTE — H&P ADULT - PROBLEM SELECTOR PLAN 1
# Altered Mental Status  Pt was found wandering and altered in a hotel scantily clad by EMS. Hx of Polycytemi vera, PE, DVT and TIA. Off Eliquis for last 2 weeks.  Drink alcohol 4-5 days/week, last alcohol yesterday, no phx of alcohol withdrawal. Denies illicit drug. Per EMS  he had dilated pupils, had taken Ambiem, Percocet, and alcohol. No residual neuro deficit. Drug screening Amphetamin positive. increased CK 5101, cr 1.13, BUN 11, AST//97, Mg 1.3. Trop 0.1, , Alcohol<3,  CTH w/o c negative for acute intracranial heamorrage/infarction r/o stroke. Given hx alcohol abuse,  AMS could be due to alcohol withdrawal vs amphetamin use vs  TIA vs dehydration?.  -CXR wided, ordred urgent TTE  -CIWA zero  -c/w CIWA protocol 24hr  -S/p 2g Mg, ativan 2mg x2, midazolam 5mg, haldol 5mg.   -s/p mag IV push in ED  -s/p 2L NS  - Ativan 1mg q2hr if CIWA>8 # Altered Mental Status  Pt was found wandering and altered in a hotel by EMS. Admitted to heavy alcohol, last alcohol yesterday. no phx of alcohol withdrawal. CIWA zero. Denies illicit drug. Per EMS  he had dilated pupils, had taken Ambiem, Percocet, and alcohol. DUS Amphetamin positive. Hx of Polycytemi vera, PE, DVT and TIA. Off Eliquis, HTN meds for last 2 weeks. No residual neuro deficit. Labs increased CK 5101, cr 1.13, BUN 11, AST//97, Mg 1.3. Trop 0.1, , Alcohol<3,  CTH w/o negative for acute intracranial heamorrage/infarction r/o stroke. Given hx alcohol abuse,  AMS could be due to alcohol withdrawal vs amphetamin use vs  TIA vs dehydration.  -c/w CIWA protocol 24hr  -S/p 2g Mg, ativan 2mg x2, midazolam 5mg, haldol 5mg.   -s/p 2L NS  - Ativan 1mg q2hr if CIWA>8 # Altered Mental Status  Pt was found wandering and altered in a hotel by EMS. Admitted to heavy alcohol, last alcohol yesterday. no phx of alcohol withdrawal. CIWA zero. Denies illicit drug. Per EMS  he had dilated pupils, had taken Ambiem, Percocet, and alcohol. DUS Amphetamin positive. Hx of Polycytemi vera, PE, DVT and TIA. No residual neuro deficit. Out of med for Eliquis, HTN meds for last 2 weeks. . Labs increased CK 5101, cr 1.13, BUN 11, AST//97, Mg 1.3. Trop 0.1, , Alcohol<3,  CTH w/o negative for acute intracranial heamorrage/infarction r/o stroke. Given hx alcohol abuse,  AMS could be due to alcohol withdrawal vs amphetamin use vs  TIA vs dehydration.  -c/w CIWA protocol 24hr  -S/p 2g Mg, ativan 2mg x2, midazolam 5mg, haldol 5mg.   -s/p 2L NS, started on 125cc/h mIVF  - Ativan 1mg q2hr if CIWA>8 - pt found wandering without clothes and confused at hotel with dilated pupils, lethargy, and slurred speech by EMS after drinking heavily the evening prior  - pt admits to intermittent alcohol use 3-4x/wk without any history of withdrawal  - per chart notes, CIWA 5 on arrival to [facility] with mild agitation and confusion requiring 1:1 obs and resolving after ativan 2mg x2, midazolam 5mg, haldol 5mg  - serum alcohol neg, UDS +amphetamines   - on exam, AOx3 and more alert though appears fatigued   - CTH neg  - initial AMS likely in the setting of alcohol and substance abuse   - also consider TIA, dehydration, infectious etiology  - continue to monitor mental status

## 2020-11-11 NOTE — H&P ADULT - PROBLEM SELECTOR PLAN 8
Hx of Prothrombin gene mutation. Pt had TIA episode 2019. No residual neuro deficit. Has not taken anti-HTN, eliquis, home med pravastatin 40mg daily  -Hold on Eliquis for now due to Aortic aneurysm   -c/w Atorvastatin 10mg Hx of Prothrombin gene mutation. Pt had TIA episode 2019. No residual neuro deficit. Has not taken anti-HTN, eliquis, home med pravastatin 40mg daily for 2 weeks  -Hold on Eliquis for now due to Aortic aneurysm   -c/w Atorvastatin 10mg - pt with known hx prothrombin gene mutation and polycythemia vera, as above  - non-compliant with eliquis and anti-HTN  - per pt, hx TIA 2019  - neuro exam benign  - CTH neg acute infarct/hemorrhage  - c/w therapeutic exchange for pravastatin 40mg -- atorvastatin 10mg  - c/w anti-HTN as above  - restart eliquis as appropriate

## 2020-11-11 NOTE — H&P ADULT - HISTORY OF PRESENT ILLNESS
Pt is a 60 yo M with PMH HTN, DVT (2014), PE (2015), HIV, polycythemia vera, and prothrombin gene mutation who p/w AMS. Was found wandering without clothes/shoes in a hotel and EMS called to bring pt to hospital. Admitted to heavy alcohol use the evening prior at a birthday party along with percocet and Ambien use. Does not recall entirety of events. Denies drug use and states he drinks alcohol 3-4x/wk without any history of withdrawal in the past. Per EMS, pt was confused, had slurred speech and dilated pupils, and intermittently somnolent though AOx3. Also agitated and attempting to leave the ED at times. Pt's PCP manages HIV meds and is based in [place], seen earlier this year and typically 2x/y. Pt also follows with cardiology and heme-onc at [place]. Has not taken anti-HTN, eliquis, or HAART in 2-3wks (out of meds). Has not had phlebotomy for PV in "a while". Regarding AC, was initially on eliquis but then there was c/f repeat clot while on eliquis and pt transitioned to lovenox spring 2019, recently was switched back to eliquis as hematology believed the clot was residual and not acute. Also says he often injects trimex into his penis to allow for "firmer" erections, most recently evening prior to arrival and has chronic erythema/swelling at the site. Obtains Rx from ?[facility]. At present, no other complaints.    On arrival, afebrile, , /82, RR 18 O2sat 96% RA. Labs with WBC 8.76, neutrophils 85.5%, plt 141, PT 14.5, INR 1.24, PTT 29.5, K 3.8, glu 140, Mg 1.3, T bili 1.6, , ALT 97, CK 5101, trop 0.1, . UDS +amphetamines, serum alcohol neg. COVID neg. Pt given 1L NS bolus x2 and started on 150cc/h mIVF. Given 2g Mg, ativan 2mg x2, midazolam 5mg, haldol 5mg. CTH neg. CXR without acute infiltrate.     Of note, per chart review: Pt is a 60 yo M with PMH HTN, DVT (2014), PE (2015), HIV, polycythemia vera, prothrombin gene mutation, and AAA who p/w AMS. Was found wandering without clothes/shoes in a hotel and EMS called to bring pt to hospital. Admitted to heavy alcohol use the evening prior at a birthday party along with percocet and Ambien use. Does not recall entirety of events. Denies drug use and states he drinks alcohol 3-4x/wk without any history of withdrawal in the past. Per EMS, pt was confused, had slurred speech and dilated pupils, and intermittently somnolent though AOx3. Also agitated and attempting to leave the ED at times. Pt's PCP manages HIV meds and is based in [place], seen earlier this year and typically 2x/y. Pt also follows with cardiology and heme-onc at [place]. Has not taken anti-HTN, eliquis, or HAART in 2-3wks (out of meds). Has not had phlebotomy for PV in "a while". Regarding AC, was initially on eliquis but then there was c/f repeat clot while on eliquis and pt transitioned to lovenox spring 2019, recently was switched back to eliquis as hematology believed the clot was residual and not acute. Also says he often injects trimex into his penis to allow for "firmer" erections, most recently evening prior to arrival and has chronic erythema/swelling at the site. Obtains Rx from ?online physician [facility] pharmacy. At present, no other complaints.    On arrival, afebrile, , /82, RR 18 O2sat 96% RA. Labs with WBC 8.76, neutrophils 85.5%, plt 141, PT 14.5, INR 1.24, PTT 29.5, K 3.8, glu 140, Mg 1.3, T bili 1.6, , ALT 97, CK 5101, trop 0.1, . UDS +amphetamines, serum alcohol neg. COVID neg. Pt given 1L NS bolus x2 and started on 150cc/h mIVF. Given 2g Mg, ativan 2mg x2, midazolam 5mg, haldol 5mg. CTH neg. CXR without acute infiltrate.     Of note, per chart review: imaging from 1/2016 showing BL PEs and 3/2016 PE's resolved but cardiomegaly with mild pulmonary artery dilation c/f pulmonary HTN. Pt is a 60 yo M with PMH HTN, DVT (2014), PE (2015), HIV, polycythemia vera, prothrombin gene mutation, and aortic root aneurysm (2016) who p/w AMS. Was found wandering without clothes/shoes in a hotel and EMS called to bring pt to hospital. Admitted to heavy alcohol use the evening prior at a birthday party along with percocet and Ambien use. Does not recall entirety of events. Denies drug use and states he drinks alcohol 3-4x/wk without any history of withdrawal in the past. Per EMS, pt was confused, had slurred speech and dilated pupils, and intermittently somnolent though AOx3. Also agitated and attempting to leave the ED at times. Pt's PCP manages HIV meds and is based in [place], seen earlier this year and typically 2x/y. Pt also follows with cardiology and heme-onc at [facility]. Dx with aortic root aneurysm 2016 and last checked 9/2020 and "stable". Has not taken anti-HTN, eliquis, or HAART in 2-3wks (out of meds). Has not had phlebotomy for PV in "a while". Regarding AC, was initially on eliquis but then there was c/f repeat clot while on eliquis and pt transitioned to lovenox spring 2019, recently was switched back to eliquis as hematology believed the clot was residual and not acute. Also says he often injects trimex into his penis to allow for "firmer" erections, most recently evening prior to arrival and has chronic erythema/swelling at the site. Obtains Rx from ?online physician and pharmacy. At present, no other complaints.    On arrival, afebrile, , /82, RR 18 O2sat 96% RA. Labs with WBC 8.76, neutrophils 85.5%, plt 141, PT 14.5, INR 1.24, PTT 29.5, K 3.8, glu 140, Mg 1.3, T bili 1.6, , ALT 97, CK 5101, trop 0.1, . UDS +amphetamines, serum alcohol neg. COVID neg. Pt given 1L NS bolus x2 and started on 150cc/h mIVF. Given 2g Mg, ativan 2mg x2, midazolam 5mg, haldol 5mg. CTH neg. CXR without acute infiltrate.     Of note, per chart review: imaging from 1/2016 showing BL PEs and 3/2016 PE's resolved but cardiomegaly with mild pulmonary artery dilation c/f pulmonary HTN.

## 2020-11-11 NOTE — ED PROVIDER NOTE - CARDIAC, MLM
Tachycardiac rate, regular rhythm.  Heart sounds S1, S2.  No murmurs, rubs or gallops. Tachycardiac rate, regular rhythm.   No murmurs, rubs or gallops.

## 2020-11-11 NOTE — CONSULT NOTE ADULT - ASSESSMENT
Patient is a 59M w/ Erectile Dysfunction using Prostaglandin injection for erections.  Patient has no indication of priaprism since  team was able to bend penis 90 degrees w/ no pain or discomfort. He is able to urinate w/o any issues, and is not complaining of dysuria or hematuria.  Patient follows up with his urologist consistently in [place].  He requires no surgical intervention at this time and can follow up with his Urologist within 2 weeks after discharge.    Recs:  -No surgical intervention needed at this time.  -Follow up w/ Urologist within 2 weeks after discharge.

## 2020-11-11 NOTE — H&P ADULT - PROBLEM SELECTOR PLAN 4
-A 4.6cm aortic root aneurysm was incidentally found during a CT scan in Jan 2016 in [facility]  -Per pt last check Aortic check was Sept 2019, stable  -TTE done 2019 w/ EF 65%   - Bedside TTE, f/u reaport    #HTN (hypertension).   -c/w home Metoprolol Succinate ER 25mg bid   - c/w home Losartan 100mg bid -A 4.6cm aortic root aneurysm was incidentally found during a CT scan in Jan 2016 in [facility]  - last checked 9/2020 and "stable"  -TTE done 2019 w/ EF 65%   - Bedside TTE, f/u reaport    #HTN (hypertension).   -c/w home Metoprolol Succinate ER 25mg bid   - c/w home Losartan 100mg bid - 2016 CT chest c/w incidental 4.6cm aortic root aneurysm   - per pt, last checked 9/2020 and "stable"  - 2019 TTE with EF 65%   - CXR on arrival c/f widened mediastinum compared to 2016   - pt tachycardic on arrival and mildly hypertensive  - cards fellow to perform bedside echo, f/u results  - official TTE pending, f/u results  - currently HDS and clinically comfortable on exam  - continue to monitor and consider vascular consult as needed    #HTN (hypertension)  - pt with known hx HTN, on toprol 25mg BID and losartan 100mg daily outpt  - c/w home meds - 2016 CT chest c/w incidental 4.6cm aortic root aneurysm   - per pt, last checked 9/2020 and "stable"  - 2019 TTE with EF 65%   - CXR on arrival c/f widened mediastinum compared to 2016   - pt tachycardic on arrival and mildly hypertensive  - cards fellow to perform bedside echo, f/u results  - official TTE pending, f/u results  - will obtain CTA chest with IV contrast --- pt with hives reaction to contrast, will pre-treat with IV benadryl and 40mg IV solumedrol prior to contrast administration  - currently HDS and clinically comfortable on exam  - continue to monitor and consider vascular consult as needed    #HTN (hypertension)  - pt with known hx HTN, on toprol 25mg BID and losartan 100mg daily outpt  - c/w home meds

## 2020-11-11 NOTE — H&P ADULT - PROBLEM SELECTOR PLAN 10
F: s/p Pt given 1L NS bolus x2 and started on 150cc/h mIVF  E: Replete mg<2, K< 4   N: DASH/TLC  DVT Ppx: Hold on ACs for now due to Aortic aneurysm   GI PPx: None  Code: FULL  Dispo: [facility] - F: s/p 2L NS bolus and c/w mIVF 125 cc/h  - E: replete K<4, Mg<2  - N: DASH/TLC  - D: hold in setting of c/f widened mediastinum, restart eliquis 5mg BID as appropriate  - G: none    Code: full  Dispo: 7L

## 2020-11-11 NOTE — H&P ADULT - PROBLEM SELECTOR PLAN 3
#Prothrombin gene mutation/Polycythemia Vera  - Routine Phlebotomy   - Hb 16.4  - Hem/onco consult #Prothrombin gene mutation/Polycythemia Vera  - Has not had phlebotomy for PV in "a while".  - Hb 16.4  - Hem/onco consult - pt with known hx prothrombin gene mutation after forming BL PE 2016  - previously on eliquis, transitioned to lovenox, now back on eliquis  - has been out of medication for a "few" weeks  - hold reinitiation given c/f widened mediastinum as below  - restart eliquis 5mg BID as appropriate    #Polycythemia vera  - pt with known hx PV, dx 2016 after PE as above  - follows with heme-onc @ [facility] and advised to have intermittent therapeutic phlebotomy but has not "in a while"  - on arrival, Hb 16.4 with Hct 47.6 -- also likely volume down at the time  - continue to monitor H/H  - consider AM heme-onc consult    #Prolonged QTc  - on arrival, EKG with QTc >500  - s/p haldol in ED  - will replete Mg as above and repeat EKG  - cautious with QT prolonging agents - avoid librium and haldol especially with c/f alcohol withdrawal

## 2020-11-11 NOTE — ED CDU PROVIDER INITIAL DAY NOTE - DETAILS
Labs, EKG, urine, and neuro imaging reviewed. Patient with hypomagnesemia, mild rhabdomyolysis, and mild transaminitis. Drug urine screening with +amphetamines. Negative for alcohol. CT brain unremarkable. Slightly more lucid now. Now A&O x 3. Seems very fidgety and anxious. CIWA score of 5. Patient states his last drink was 9:30pm last night. Endorses using homeopathic medications but no weight loss or dietary supplements. Compliant with blood pressure medications (metoprolol) and HIV medications.  He has not recently increased his alcohol use and states he drinks 3-4 time a week. Denies history of withdrawal symptoms. Patient remains tachycardiac, but blood pressure improved. Magnesium replaced, IV hydration given. Will order Ativan for management with symptoms and contact partner for collateral. Patient states he has not been tested for COVID, so will order COVID testing for admission purposes. Will initiate observation at this time for serial neurological exam and monitoring.  AMS likely due to polysubstance vs alcohol withdrawal.

## 2020-11-11 NOTE — H&P ADULT - NSHPLABSRESULTS_GEN_ALL_CORE
LABS:                        16.4   8.76  )-----------( 141      ( 11 Nov 2020 08:46 )             47.6     11-11    137  |  100  |  11  ----------------------------<  140<H>  3.8   |  22  |  1.13    Ca    8.8      11 Nov 2020 08:46  Mg     1.3     11-11    TPro  7.7  /  Alb  3.8  /  TBili  1.6<H>  /  DBili  x   /  AST  170<H>  /  ALT  97<H>  /  AlkPhos  24<L>  11-11    PT/INR - ( 11 Nov 2020 08:47 )   PT: 14.5 sec;   INR: 1.24          PTT - ( 11 Nov 2020 08:47 )  PTT:29.5 sec    CAPILLARY BLOOD GLUCOSE      POCT Blood Glucose.: 130 mg/dL (11 Nov 2020 08:19)      RADIOLOGY & ADDITIONAL TESTS: Reviewed.

## 2020-11-11 NOTE — H&P ADULT - NSHPPHYSICALEXAM_GEN_ALL_CORE
VITAL SIGNS:  T(C): 36.3 (11-11-20 @ 15:03), Max: 36.9 (11-11-20 @ 08:06)  T(F): 97.4 (11-11-20 @ 15:03), Max: 98.5 (11-11-20 @ 08:06)  HR: 101 (11-11-20 @ 15:03) (88 - 137)  BP: 155/69 (11-11-20 @ 15:03) (146/82 - 170/70)  BP(mean): 99 (11-11-20 @ 15:03) (99 - 99)  RR: 16 (11-11-20 @ 15:03) (16 - 22)  SpO2: 92% (11-11-20 @ 15:03) (92% - 97%)  Wt(kg): --    PHYSICAL EXAM:  Constitutional: WDWN resting comfortably in bed; NAD; appears mildly fatigued  Head: NC/AT; erythema of face/neck/chest (blanchable and non-tender)  Eyes: PERRL, EOMI, anicteric sclera  ENT: no nasal discharge; MMM  Neck: supple; no JVD  Respiratory: CTA B/L; no W/R/R  Cardiac: +S1/S2; RRR; no M/R/G  Gastrointestinal: soft, NT/ND; no rebound or guarding; +BSx4  : penis flaccid swollen and erythematous without visible drainage and non-tender to exam  Extremities: WWP, no clubbing or cyanosis; no peripheral edema  Musculoskeletal: NROM x4; no joint swelling, tenderness or erythema  Vascular: 2+ radial, DP/PT pulses B/L  Dermatologic: skin warm, dry and intact; erythema blanchable to face/neck/chest (c/w sunburn); RUE abrasions to lateral hand  Neurologic: AAOx3; CNII-XII grossly intact; no focal deficits  Psychiatric: affect and characteristics of appearance, verbalizations, behaviors are appropriate VITAL SIGNS:  T(C): 36.3 (11-11-20 @ 15:03), Max: 36.9 (11-11-20 @ 08:06)  T(F): 97.4 (11-11-20 @ 15:03), Max: 98.5 (11-11-20 @ 08:06)  HR: 101 (11-11-20 @ 15:03) (88 - 137)  BP: 155/69 (11-11-20 @ 15:03) (146/82 - 170/70)  BP(mean): 99 (11-11-20 @ 15:03) (99 - 99)  RR: 16 (11-11-20 @ 15:03) (16 - 22)  SpO2: 92% (11-11-20 @ 15:03) (92% - 97%)  Wt(kg): --    PHYSICAL EXAM:  CIWA 0  Constitutional: WDWN resting comfortably in bed; NAD; appears mildly fatigued  Head: NC/AT; erythema of face/neck/chest (blanchable and non-tender)  Eyes: PERRL, EOMI, anicteric sclera  ENT: no nasal discharge; MMM  Neck: supple; no JVD  Respiratory: CTA B/L; no W/R/R  Cardiac: +S1/S2; RRR; no M/R/G  Gastrointestinal: soft, NT/ND; no rebound or guarding; +BSx4  : penis flaccid swollen and erythematous without visible drainage and non-tender to exam  Extremities: WWP, no clubbing or cyanosis; no peripheral edema  Musculoskeletal: NROM x4; no joint swelling, tenderness or erythema  Vascular: 2+ radial, DP/PT pulses B/L  Dermatologic: skin warm, dry and intact; erythema blanchable to face/neck/chest (c/w sunburn); RUE abrasions to lateral hand  Neurologic: AAOx3; CNII-XII grossly intact; no focal deficits  Psychiatric: affect and characteristics of appearance, verbalizations, behaviors are appropriate

## 2020-11-11 NOTE — ED CDU PROVIDER INITIAL DAY NOTE - PROGRESS NOTE DETAILS
Patient pulled out IV and got out of bed.  Requesting to leave the ED but appears more confused and disoriented.  Does not have capacity to leave the ED or sign out AMA.  Attempting to get collateral from partner.  Delirium likely related to alcohol withdrawal or amphetamine use.  Ordered additional IV ativan, rectal temp. Called back to bedside. Patient got out of bed. Unsteady gait. Patient redirected. Remains tremulous and disoriented. Additional sedation ordered. Placed on 1:1 due to elopement and fall risk. Patient's troponin is elevated likely due to persistent tachycardia. Denies chest pain. No EKG changes. Will repeat after hydration. Rectal temperature obtained and WNL. Called back to bedside. Patient got out of bed. Unsteady gait. Patient redirected. Remains tremulous and disoriented. Additional sedation ordered. Placed on 1:1 due to elopement and fall risk. Patient's troponin is elevated likely due to persistent tachycardia. Denies chest pain. No EKG changes. Will repeat after hydration. Rectal temperature obtained and WNL.  Will admit to Tele Stepdown at [facility] for delirium.  COVID result pending.  D/w Dr. Amaya. Called back to bedside. Patient got out of bed. Unsteady gait. Patient redirected. Remains tremulous and disoriented. Additional sedation ordered. Placed on 1:1 due to elopement and fall risk. Patient's troponin is elevated likely due to persistent tachycardia. Denies chest pain. No ischemic EKG changes.  Endorses compliance with eliqius and with hx of DVT/PE.  IV contrast allergy.  Recommend inpatient ECHO. Will repeat after hydration. Rectal temperature obtained and WNL.  Will admit to Tele Stepdown at [facility] for delirium.  COVID result pending.  D/w Dr. Amaya. Called back to bedside. Patient got out of bed. Unsteady gait. Patient redirected. Remains tremulous and disoriented. Additional sedation ordered. Placed on 1:1 due to elopement and fall risk. Patient's troponin is elevated likely due to persistent tachycardia. Denies chest pain. No ischemic EKG changes.  Endorses compliance with eliqius and with hx of DVT/PE.  IV contrast allergy.  Recommend inpatient ECHO. Will repeat after hydration. Rectal temperature obtained and WNL.  No leukocytosis or cough.  Sepsis not likely cause of persistent tachycardia.  Will admit to Tele Stepdown at [facility] for delirium.  COVID result pending.  D/w Dr. Amaya.

## 2020-11-11 NOTE — H&P ADULT - PROBLEM SELECTOR PLAN 9
- on Juluca/ Dolutegravir/Tivicay 50mg BID ??   - On Edurant 25mg/Rilpivirine 25mg daily  _ Pt's PCP manages HIV meds and is based in [place]  -Would like to have PCP in [facility]  -consult with Dr. Burgos - on Juluca/ Dolutegravir/Tivicay 50mg BID ??   - On Edurant 25mg/Rilpivirine 25mg daily?  - Pt's PCP manages HIV meds and is based in [place]  -He would like to have PCP in [facility]  -consult with Dr. Burgos - pt follows with PCP in [place] who manages HAART  - admits to previously being on juluca and being transitioned to tivicay   - unclear when last viral load/CD4 counts tested -- pt admits to MSM and "mostly" monogamous with  and "intermittent" condom use  - med rec also with Edurant 25mg daily?  - am HIV consult for further recs

## 2020-11-12 ENCOUNTER — TRANSCRIPTION ENCOUNTER (OUTPATIENT)
Age: 60
End: 2020-11-12

## 2020-11-12 VITALS — TEMPERATURE: 99 F

## 2020-11-12 LAB
ALBUMIN SERPL ELPH-MCNC: 3.5 G/DL — SIGNIFICANT CHANGE UP (ref 3.3–5)
ALP SERPL-CCNC: 22 U/L — LOW (ref 40–120)
ALT FLD-CCNC: 75 U/L — HIGH (ref 10–45)
ANION GAP SERPL CALC-SCNC: 13 MMOL/L — SIGNIFICANT CHANGE UP (ref 5–17)
AST SERPL-CCNC: 127 U/L — HIGH (ref 10–40)
BILIRUB SERPL-MCNC: 0.9 MG/DL — SIGNIFICANT CHANGE UP (ref 0.2–1.2)
BUN SERPL-MCNC: 8 MG/DL — SIGNIFICANT CHANGE UP (ref 7–23)
CALCIUM SERPL-MCNC: 8.2 MG/DL — LOW (ref 8.4–10.5)
CHLORIDE SERPL-SCNC: 100 MMOL/L — SIGNIFICANT CHANGE UP (ref 96–108)
CK SERPL-CCNC: 1372 U/L — HIGH (ref 30–200)
CO2 SERPL-SCNC: 20 MMOL/L — LOW (ref 22–31)
CREAT SERPL-MCNC: 0.96 MG/DL — SIGNIFICANT CHANGE UP (ref 0.5–1.3)
GLUCOSE SERPL-MCNC: 124 MG/DL — HIGH (ref 70–99)
HCT VFR BLD CALC: 45.3 % — SIGNIFICANT CHANGE UP (ref 39–50)
HGB BLD-MCNC: 15.6 G/DL — SIGNIFICANT CHANGE UP (ref 13–17)
MAGNESIUM SERPL-MCNC: 2.1 MG/DL — SIGNIFICANT CHANGE UP (ref 1.6–2.6)
MCHC RBC-ENTMCNC: 32.7 PG — SIGNIFICANT CHANGE UP (ref 27–34)
MCHC RBC-ENTMCNC: 34.4 GM/DL — SIGNIFICANT CHANGE UP (ref 32–36)
MCV RBC AUTO: 95 FL — SIGNIFICANT CHANGE UP (ref 80–100)
NRBC # BLD: 0 /100 WBCS — SIGNIFICANT CHANGE UP (ref 0–0)
PHOSPHATE SERPL-MCNC: 2.7 MG/DL — SIGNIFICANT CHANGE UP (ref 2.5–4.5)
PLATELET # BLD AUTO: 143 K/UL — LOW (ref 150–400)
POTASSIUM SERPL-MCNC: 4.8 MMOL/L — SIGNIFICANT CHANGE UP (ref 3.5–5.3)
POTASSIUM SERPL-SCNC: 4.8 MMOL/L — SIGNIFICANT CHANGE UP (ref 3.5–5.3)
PROT SERPL-MCNC: 6.5 G/DL — SIGNIFICANT CHANGE UP (ref 6–8.3)
RBC # BLD: 4.77 M/UL — SIGNIFICANT CHANGE UP (ref 4.2–5.8)
RBC # FLD: 14.2 % — SIGNIFICANT CHANGE UP (ref 10.3–14.5)
SODIUM SERPL-SCNC: 133 MMOL/L — LOW (ref 135–145)
WBC # BLD: 9.05 K/UL — SIGNIFICANT CHANGE UP (ref 3.8–10.5)
WBC # FLD AUTO: 9.05 K/UL — SIGNIFICANT CHANGE UP (ref 3.8–10.5)

## 2020-11-12 PROCEDURE — 93308 TTE F-UP OR LMTD: CPT | Mod: 26

## 2020-11-12 PROCEDURE — 71275 CT ANGIOGRAPHY CHEST: CPT

## 2020-11-12 PROCEDURE — 96374 THER/PROPH/DIAG INJ IV PUSH: CPT

## 2020-11-12 PROCEDURE — 99233 SBSQ HOSP IP/OBS HIGH 50: CPT | Mod: GC

## 2020-11-12 PROCEDURE — 80053 COMPREHEN METABOLIC PANEL: CPT

## 2020-11-12 PROCEDURE — 96375 TX/PRO/DX INJ NEW DRUG ADDON: CPT

## 2020-11-12 PROCEDURE — 96376 TX/PRO/DX INJ SAME DRUG ADON: CPT

## 2020-11-12 PROCEDURE — 84100 ASSAY OF PHOSPHORUS: CPT

## 2020-11-12 PROCEDURE — 85025 COMPLETE CBC W/AUTO DIFF WBC: CPT

## 2020-11-12 PROCEDURE — 83735 ASSAY OF MAGNESIUM: CPT

## 2020-11-12 PROCEDURE — 80048 BASIC METABOLIC PNL TOTAL CA: CPT

## 2020-11-12 PROCEDURE — 71275 CT ANGIOGRAPHY CHEST: CPT | Mod: 26

## 2020-11-12 PROCEDURE — 85730 THROMBOPLASTIN TIME PARTIAL: CPT

## 2020-11-12 PROCEDURE — 82550 ASSAY OF CK (CPK): CPT

## 2020-11-12 PROCEDURE — 85027 COMPLETE CBC AUTOMATED: CPT

## 2020-11-12 PROCEDURE — 85610 PROTHROMBIN TIME: CPT

## 2020-11-12 PROCEDURE — 70450 CT HEAD/BRAIN W/O DYE: CPT

## 2020-11-12 PROCEDURE — 93005 ELECTROCARDIOGRAM TRACING: CPT

## 2020-11-12 PROCEDURE — 83880 ASSAY OF NATRIURETIC PEPTIDE: CPT

## 2020-11-12 PROCEDURE — G0378: CPT

## 2020-11-12 PROCEDURE — 99285 EMERGENCY DEPT VISIT HI MDM: CPT | Mod: 25

## 2020-11-12 PROCEDURE — 96372 THER/PROPH/DIAG INJ SC/IM: CPT | Mod: XU

## 2020-11-12 PROCEDURE — 71045 X-RAY EXAM CHEST 1 VIEW: CPT

## 2020-11-12 PROCEDURE — 87086 URINE CULTURE/COLONY COUNT: CPT

## 2020-11-12 PROCEDURE — 81001 URINALYSIS AUTO W/SCOPE: CPT

## 2020-11-12 PROCEDURE — 84484 ASSAY OF TROPONIN QUANT: CPT

## 2020-11-12 PROCEDURE — 93306 TTE W/DOPPLER COMPLETE: CPT

## 2020-11-12 PROCEDURE — 82962 GLUCOSE BLOOD TEST: CPT

## 2020-11-12 PROCEDURE — 80307 DRUG TEST PRSMV CHEM ANLYZR: CPT

## 2020-11-12 PROCEDURE — 87635 SARS-COV-2 COVID-19 AMP PRB: CPT

## 2020-11-12 PROCEDURE — 36415 COLL VENOUS BLD VENIPUNCTURE: CPT

## 2020-11-12 RX ORDER — DOLUTEGRAVIR SODIUM 25 MG/1
1 TABLET, FILM COATED ORAL
Qty: 0 | Refills: 0 | DISCHARGE

## 2020-11-12 RX ORDER — APIXABAN 2.5 MG/1
5 TABLET, FILM COATED ORAL EVERY 12 HOURS
Refills: 0 | Status: DISCONTINUED | OUTPATIENT
Start: 2020-11-12 | End: 2020-11-12

## 2020-11-12 RX ORDER — METOPROLOL TARTRATE 50 MG
1 TABLET ORAL
Qty: 60 | Refills: 0
Start: 2020-11-12 | End: 2020-12-11

## 2020-11-12 RX ORDER — APIXABAN 2.5 MG/1
1 TABLET, FILM COATED ORAL
Qty: 60 | Refills: 0
Start: 2020-11-12 | End: 2020-12-11

## 2020-11-12 RX ORDER — APIXABAN 2.5 MG/1
1 TABLET, FILM COATED ORAL
Qty: 0 | Refills: 0 | DISCHARGE

## 2020-11-12 RX ORDER — METOPROLOL TARTRATE 50 MG
1 TABLET ORAL
Qty: 0 | Refills: 0 | DISCHARGE

## 2020-11-12 RX ORDER — LOSARTAN POTASSIUM 100 MG/1
1 TABLET, FILM COATED ORAL
Qty: 0 | Refills: 0 | DISCHARGE

## 2020-11-12 RX ORDER — THIAMINE MONONITRATE (VIT B1) 100 MG
1 TABLET ORAL
Qty: 30 | Refills: 0
Start: 2020-11-12 | End: 2020-12-11

## 2020-11-12 RX ORDER — RILPIVIRINE HYDROCHLORIDE 25 MG/1
1 TABLET, FILM COATED ORAL
Qty: 0 | Refills: 0 | DISCHARGE

## 2020-11-12 RX ORDER — FOLIC ACID 0.8 MG
1 TABLET ORAL
Qty: 30 | Refills: 0
Start: 2020-11-12 | End: 2020-12-11

## 2020-11-12 RX ORDER — LOSARTAN POTASSIUM 100 MG/1
1 TABLET, FILM COATED ORAL
Qty: 30 | Refills: 0
Start: 2020-11-12 | End: 2020-12-11

## 2020-11-12 RX ADMIN — APIXABAN 5 MILLIGRAM(S): 2.5 TABLET, FILM COATED ORAL at 10:12

## 2020-11-12 RX ADMIN — SODIUM CHLORIDE 125 MILLILITER(S): 9 INJECTION INTRAMUSCULAR; INTRAVENOUS; SUBCUTANEOUS at 00:38

## 2020-11-12 RX ADMIN — Medication 25 MILLIGRAM(S): at 06:17

## 2020-11-12 RX ADMIN — LOSARTAN POTASSIUM 100 MILLIGRAM(S): 100 TABLET, FILM COATED ORAL at 06:17

## 2020-11-12 NOTE — DISCHARGE NOTE PROVIDER - CARE PROVIDERS DIRECT ADDRESSES
[email or web address]

## 2020-11-12 NOTE — DISCHARGE NOTE PROVIDER - HOSPITAL COURSE
#Discharge: do not delete    Pt is a 60 yo M with PMH HTN, DVT (2014), PE (2015), HIV, polycythemia vera, prothrombin gene mutation, and aortic root aneurysm (2016) who p/w AMS, likely in the setting of recent alcohol abuse. Admitted to  for further observation and management. Stable for DC home with outpt f/u.    Problem List/Main Diagnoses (system-based):       Inpatient treatment course:   Pt is a 60 yo M with PMH HTN, DVT (2014), PE (2015), HIV, polycythemia vera, prothrombin gene mutation, and aortic root aneurysm (2016) who p/w AMS. Was found wandering without clothes/shoes in a hotel and EMS called to bring pt to hospital. Admitted to heavy alcohol use the evening prior at a birthday party along with percocet and Ambien use and does not recall entirety of events. Denies drug use and states he drinks alcohol 3-4x/wk without any history of withdrawal in the past. Per EMS, pt was confused, had slurred speech and dilated pupils, and intermittently somnolent though AOx3. Has not taken anti-HTN, eliquis, or HAART in 2-3wks (out of meds). Also says he often injects trimex into his penis to allow for "firmer" erections, most recently evening prior to arrival and has chronic erythema/swelling at the site. On arrival, afebrile, , /82, RR 18 O2sat 96% RA. Labs with WBC 8.76, neutrophils 85.5%, plt 141, PT 14.5, INR 1.24, PTT 29.5, K 3.8, glu 140, Mg 1.3, T bili 1.6, , ALT 97, CK 5101, trop 0.1, . UDS +amphetamines, serum alcohol neg. COVID neg. Pt given 1L NS bolus x2 and started on 150cc/h mIVF. Given 2g Mg, ativan 2mg x2, midazolam 5mg, haldol 5mg. CTH neg. CXR without acute infiltrate. CTA chest neg PE and with stable aortic root aneurysm 4.7cm without dissection. Pt f/w cardiology and heme-onc at [facility] and prefers to f/u with them on dc. HIV consulted inpt and pt to f/u with Dr. Aubrey mylespt. Urology recs NTD regarding penile inflammation. Pt now stable for dc home with outpt f/u.    New medications: none    Labs to be followed outpatient: none    Exam to be followed outpatient: none       #Discharge: do not delete    Pt is a 58 yo M with PMH HTN, DVT (2014), PE (2015), HIV, polycythemia vera, prothrombin gene mutation, and aortic root aneurysm (2016) who p/w AMS, likely in the setting of recent alcohol abuse. Admitted to  for further observation and management. Stable for DC home with outpt f/u.    Problem List/Main Diagnoses (system-based):   #Metabolic encephalopathy  - likely in the setting of acute alcohol intoxication and polysubstance abuse  - UDS+ methamphetamines, serum alcohol 0  - CIWA 5 on arrival, sequentially 0 and protocol dc'd  - now back to baseline  - recommend alcohol cessation/decrease  - CTH neg, no infectious etiology    #Elevated CK  - likely 2/2 alcohol intake and periods of "being down"  - exam benign  - s/p fluid hydration with downtrending CK    #Hypomagnesemia and hypokalemia  - now resolved s/p repletion, likely 2/2 poor PO and alcohol use    #Prothrombin gene mutation  - on eliquis 5mg BID, though noncompliant x3-4 wks d/t running out of Rx  - restarted eliquis  - TTE WNL  - CTA chest neg PE or dissection    #Polycythemia vera  - as above with PT gene mutation    #Prolonged QTc  - QTc 540 on arrival, 525 on dc  - avoid prolonging meds  - unclear etiology  - will defer reinitiation of HAART as below  - outpt cards and HIV f/u    #Aortic aneurysm  - 2016 CT chest c/w incidental 4.6cm aortic root aneurysm, last checked 9/2020 and "stable"  - CXR on arrival c/f widened mediastinal  - bedside TTE benign  - CTA chest neg dissection  - c/w anti-HTN and eliquis  - outpt cards f/u    #HTN  - c/w home anti-HTN    #PE  - as above    #DVT  - as above    #ED  - pt with testosterone injections and and penile injections  - urology saw pt and NTD  - outpt f/u    #TIA  - CTH neg  - c/w home meds    #HIV  - previously on biktarvy, switched to juluca d/t interaction with eliquis and cost  - off HAART 3-4 wks and last labs Feb or Mar 2020  - hold reinitiation of biktarvy d/t prolonged QTc  - f/u Dr. Burgos/Jero within 1 wk    Inpatient treatment course:   Pt is a 58 yo M with PMH HTN, DVT (2014), PE (2015), HIV, polycythemia vera, prothrombin gene mutation, and aortic root aneurysm (2016) who p/w AMS. Was found wandering without clothes/shoes in a hotel and EMS called to bring pt to hospital. Admitted to heavy alcohol use the evening prior at a birthday party along with percocet and Ambien use and does not recall entirety of events. Denies drug use and states he drinks alcohol 3-4x/wk without any history of withdrawal in the past. Per EMS, pt was confused, had slurred speech and dilated pupils, and intermittently somnolent though AOx3. Has not taken anti-HTN, eliquis, or HAART in 2-3wks (out of meds). Also says he often injects trimex into his penis to allow for "firmer" erections, most recently evening prior to arrival and has chronic erythema/swelling at the site. On arrival, afebrile, , /82, RR 18 O2sat 96% RA. Labs with WBC 8.76, neutrophils 85.5%, plt 141, PT 14.5, INR 1.24, PTT 29.5, K 3.8, glu 140, Mg 1.3, T bili 1.6, , ALT 97, CK 5101, trop 0.1, . UDS +amphetamines, serum alcohol neg. COVID neg. Pt given 1L NS bolus x2 and started on 150cc/h mIVF. Given 2g Mg, ativan 2mg x2, midazolam 5mg, haldol 5mg. CTH neg. CXR without acute infiltrate. CTA chest neg PE and with stable aortic root aneurysm 4.7cm without dissection. Pt f/w cardiology and heme-onc at [facility] and prefers to f/u with them on dc. HIV consulted inpt and pt to f/u with Dr. Burgos outpt. Urology recs NTD regarding penile inflammation. Pt now stable for dc home with outpt f/u.    New medications: none    Labs to be followed outpatient: none    Exam to be followed outpatient: none

## 2020-11-12 NOTE — PROGRESS NOTE ADULT - ATTENDING COMMENTS
Pt to follow up with Cardiologist as outpt and HIV service with change in meds as outlined in view of prolonged QT.

## 2020-11-12 NOTE — DISCHARGE NOTE NURSING/CASE MANAGEMENT/SOCIAL WORK - PATIENT PORTAL LINK FT
You can access the FollowMyHealth Patient Portal offered by [facility] by registering at the following website: [email or web address]. By joining [facility]’s FollowMyHealth portal, you will also be able to view your health information using other applications (apps) compatible with our system.

## 2020-11-12 NOTE — PROGRESS NOTE ADULT - PROBLEM SELECTOR PLAN 9
- pt follows with PCP in [place] who manages HAART  - admits to previously being on juluca and being transitioned to tivicay   - unclear when last viral load/CD4 counts tested -- pt admits to MSM and "mostly" monogamous with  and "intermittent" condom use  - med rec also with Edurant 25mg daily?  - am HIV consult for further recs

## 2020-11-12 NOTE — DISCHARGE NOTE NURSING/CASE MANAGEMENT/SOCIAL WORK - NSDCPEPTSTRK_GEN_ALL_CORE
Stroke support groups for patients, families, and friends/Need for follow up after discharge/Prescribed medications/Risk factors for stroke/Stroke education booklet/Stroke warning signs and symptoms/Call 911 for stroke/Signs and symptoms of stroke

## 2020-11-12 NOTE — PROGRESS NOTE ADULT - PROBLEM SELECTOR PLAN 5
- as above    #Alcohol abuse  - pt presenting after binge drinking with intermittent memory loss c/f unknown amount of alcohol or substance intake  - admits to drinking alcohol 3-4x/wk  - on arrival, AST 2x greater than ALT c/w alcohol abuse  - alcohol level neg on arrival  - UDS +amphetamines  - CIWA 5 @ [facility], CIWA 0 on arrival to [facility]  - c/w CIWA protocol with 1mg ativan q2h for CIWA>8  -CIWA morning 1 mild hand tremor

## 2020-11-12 NOTE — PROGRESS NOTE ADULT - ASSESSMENT
Pt is a 58 yo M with PMH HTN, DVT (2014), PE (2015), HIV, polycythemia vera, prothrombin gene mutation, and aortic root aneurysm (2016) who p/w AMS, likely in the setting of recent alcohol abuse. Admitted to  for further observation and management.

## 2020-11-12 NOTE — DISCHARGE NOTE PROVIDER - NSDCCPTREATMENT_GEN_ALL_CORE_FT
PRINCIPAL PROCEDURE  Procedure: CTA chest w/w/o contrast  Findings and Treatment: INTERPRETATION:  CTA (CT ANGIOGRAPHY) of the CHEST,  History: History of aortic root aneurysm, PE, DVTs. Widened mediastinum.  TECHNIQUE: CTA (CT ANGIOGRAPHY) of the chest was performed. Multiplanar images of the chest were reconstructed on an independent work-station. Image postprocessing including production of axial images and coronal and sagittal maximum intensity projections (MIPs). 96.9 cc of Optiray 350 administered.  Comparison: CTA chest dated 3/18/2016.  Findings:  Vessels: Aneurysmaldilation of the aortic root, measuring up to 4.7 cm, not significant change since 2016. The ascending aorta is ectatic, measuring up to 4.0 cm, unchanged. No aortic dissection. Dilated main pulmonary artery, measuring up to 3.8 cm which may be seen in pulmonary hypertension.  Lungs and large airways: Patent large airways. Accessory left horizontal fissure, normal variant. Mosaic attenuation right lower lobe, probable for focal air trapping. 4mm subsolid nodule lateral segment right middle lobe and 5 mm pleural-based nodule right upper lobe stable since 2016. Right middle lobe calcified granuloma. Bibasilar atelectasis.  Pleura:  No pleural effusion.  Mediastinum and hilar regions: No thoracic lymphadenopathy.  Heart and pericardium:  Cardiomegaly. No pericardial effusion. Coronary artery and mitral annular calcification.  Chest wall and lower neck:  Normal.  Upper abdomen: Left adrenal gland thickening, nonspecific.  Bones: Degenerative changes of the thoracic spine.  Impression:  1.  No significant interval change since 2016 in aortic root aneurysm, measuring up to 4.7 cm.  2.  Dilated main pulmonary artery, which may be seen in pulmonary hypertension.        SECONDARY PROCEDURE  Procedure: CT head wo con  Findings and Treatment: EXAM:  CT BRAIN                           PROCEDURE DATE:  11/11/2020    INTERPRETATION:  PROCEDURE: CT head without intravenous contrast  INDICATION: Altered mental status  TECHNIQUE: Multiple axial images were obtained at 5 mm intervalsfrom the skull base to the vertex. Sagittal and coronal reformatted images were obtained from the axial data set. The images were reviewed in brain and bone windows. Some of the images are degraded by motion artifact.  COMPARISON: CT head 6/20/2019  FINDINGS: The CT examination demonstrates the ventricles, cisternal spaces, and cortical sulci to be within normal limits. There is no midline shift or extra axial collections. The gray white differentiation appears within normal limits. There is no intracranial hemorrhage or acute transcortical infarct.  The patient is status post microplating and screw fixation along the maxilla bilaterally which is incompletely evaluated. The bony windows demonstrates no calvarial fractures. There is mildmucosal thickening with a small retention cyst/polyp within the left maxillary sinus. The rest of the visualized paranasal sinuses are within normal limits. The mastoid air cells are well aerated.  IMPRESSION: No intracranial hemorrhage or acute transcortical infarct.       PRINCIPAL PROCEDURE  Procedure: CTA chest w/w/o contrast  Findings and Treatment: INTERPRETATION:  CTA (CT ANGIOGRAPHY) of the CHEST,  History: History of aortic root aneurysm, PE, DVTs. Widened mediastinum.  TECHNIQUE: CTA (CT ANGIOGRAPHY) of the chest was performed. Multiplanar images of the chest were reconstructed on an independent work-station. Image postprocessing including production of axial images and coronal and sagittal maximum intensity projections (MIPs). 96.9 cc of Optiray 350 administered.  Comparison: CTA chest dated 3/18/2016.  Findings:  Vessels: Aneurysmaldilation of the aortic root, measuring up to 4.7 cm, not significant change since 2016. The ascending aorta is ectatic, measuring up to 4.0 cm, unchanged. No aortic dissection. Dilated main pulmonary artery, measuring up to 3.8 cm which may be seen in pulmonary hypertension.  Lungs and large airways: Patent large airways. Accessory left horizontal fissure, normal variant. Mosaic attenuation right lower lobe, probable for focal air trapping. 4mm subsolid nodule lateral segment right middle lobe and 5 mm pleural-based nodule right upper lobe stable since 2016. Right middle lobe calcified granuloma. Bibasilar atelectasis.  Pleura:  No pleural effusion.  Mediastinum and hilar regions: No thoracic lymphadenopathy.  Heart and pericardium:  Cardiomegaly. No pericardial effusion. Coronary artery and mitral annular calcification.  Chest wall and lower neck:  Normal.  Upper abdomen: Left adrenal gland thickening, nonspecific.  Bones: Degenerative changes of the thoracic spine.  Impression:  1.  No significant interval change since 2016 in aortic root aneurysm, measuring up to 4.7 cm.  2.  Dilated main pulmonary artery, which may be seen in pulmonary hypertension.        SECONDARY PROCEDURE  Procedure: Limited 2D echocardiography  Findings and Treatment: CONCLUSIONS:   1. Limited study obtained for evaluation of aortic root and LV systolic function. performed by cardiology fellow on call.   2. Normal left ventricular size and systolic function.   3. Normal right ventricular size and systolic function.   4. Mild aortic regurgitation.   5. No pericardial effusion.   6. The aortic root is mildly dilated. The aortic root measures 4.40 cm at level of the sinuses of Valsalva (normal 3.1-3.7 cm for men, 2.7-3.3 cm for women). The aortic root indexed measures 2.15 cm/m² at the level of the sinuses of Valsalva (normal 1.5-1.9 cm/m2 for men, 1.6-2.0 cm/m2 for women). The proximal ascending aorta is mildly dilated. The proximal ascending aorta measures 4.30 cm (normal 2.6-3.4 cm for men, 2.3-3.1 cm for women). The proximal ascending aorta indexed measures 2.10 cm/m² (normal 1.3-1.7 cm/m2 for men, 1.3-1.9 cm/m2 for women).      Procedure: CT head wo con  Findings and Treatment: EXAM:  CT BRAIN                           PROCEDURE DATE:  11/11/2020    INTERPRETATION:  PROCEDURE: CT head without intravenous contrast  INDICATION: Altered mental status  TECHNIQUE: Multiple axial images were obtained at 5 mm intervalsfrom the skull base to the vertex. Sagittal and coronal reformatted images were obtained from the axial data set. The images were reviewed in brain and bone windows. Some of the images are degraded by motion artifact.  COMPARISON: CT head 6/20/2019  FINDINGS: The CT examination demonstrates the ventricles, cisternal spaces, and cortical sulci to be within normal limits. There is no midline shift or extra axial collections. The gray white differentiation appears within normal limits. There is no intracranial hemorrhage or acute transcortical infarct.  The patient is status post microplating and screw fixation along the maxilla bilaterally which is incompletely evaluated. The bony windows demonstrates no calvarial fractures. There is mildmucosal thickening with a small retention cyst/polyp within the left maxillary sinus. The rest of the visualized paranasal sinuses are within normal limits. The mastoid air cells are well aerated.  IMPRESSION: No intracranial hemorrhage or acute transcortical infarct.

## 2020-11-12 NOTE — PROGRESS NOTE ADULT - PROBLEM SELECTOR PLAN 6
- pt with hx DVT around time of PE dx as above  - f/u LE dopplers  - LE exam benign, though pt off eliquis for "a while"    #Transaminitis  - on arrival, AST 2x greater than ALT c/f alcohol abuse, as above  - continue to monitor with daily CMP  - exam without obvious hepatomegaly and no abdominal distention or tenderness to palpation  - consider abd US and GI consult if worsening

## 2020-11-12 NOTE — DISCHARGE NOTE PROVIDER - NSDCCPCAREPLAN_GEN_ALL_CORE_FT
PRINCIPAL DISCHARGE DIAGNOSIS  Diagnosis: Delirium due to substance or medication  Assessment and Plan of Treatment: You came to the hospital with confusion due to binge drinking the evening prior. You have now returned to your baseline mental status and are medically stable to return home. It is important to try to decrease your alcohol intake and take your medications as prescribed. Please follow up with your primary care physician within 2 weeks of discharge. You have been referred to Dr. Burgos, an internal medicine and HIV specialist, so that you have a local primary care doctor who can also manage your HIV medications. Imaging of your head was normal and your lab work has returned to baseline.      SECONDARY DISCHARGE DIAGNOSES  Diagnosis: History of HIV infection  Assessment and Plan of Treatment: We have restarted your medications and recommend following up with Dr. Burgos, an internal medicine and HIV specialist. Please follow up with her office within 1 week of discharge. She will perform routine bloodwork and ensure you are on the appropriate medications.    Diagnosis: Aortic root aneurysm  Assessment and Plan of Treatment: On arrival, your chest xray appeared wider than expected and this was concerning for progression of your known aortic root aneurysm. A bedside ultrasound was performed of your heart that showed no abnormality. A CT was performed of the chest with contrast and showed a stable 4.7cm aortic root aneurysm without any dissection and was negative for any clots. Please follow up with your [place] cardiologist. You have also been referred to Dr. Olson, a pulmonologist for further care. Dr. Olson can also address 2 benign nodules found in your lung that have been stable since 2016.    Diagnosis: Non-traumatic rhabdomyolysis  Assessment and Plan of Treatment: On arrival, your blood work showed an increase in creatine kinase, a marker for muscle irritation. This is likely due to your alcohol intake the night prior and possibly periods of being down in uncomfortable positioning that irritated your muscles. You have been treated with IV fluids and this value has decreased. Please ensure you take adequate oral fluid to ensure this continues to down trend. If you start to have any significant muscle pain, this could be an indicator of uptrending values that could warrant a return visit to the ER.     PRINCIPAL DISCHARGE DIAGNOSIS  Diagnosis: Delirium due to substance or medication  Assessment and Plan of Treatment: You came to the hospital with confusion due to binge drinking the evening prior. You have now returned to your baseline mental status and are medically stable to return home. It is important to try to decrease your alcohol intake and take your medications as prescribed. Please follow up with your primary care physician within 2 weeks of discharge. You have been referred to Dr. Burgos, an internal medicine and HIV specialist, so that you have a local primary care doctor who can also manage your HIV medications. Imaging of your head was normal and your lab work has returned to baseline.      SECONDARY DISCHARGE DIAGNOSES  Diagnosis: Prolonged QT interval  Assessment and Plan of Treatment: Your EKG on arrival showed a prolonged QTc interval, which is a measurement of the conduction pathway of your heart. The initial value was 540 (we like it under 500) and on discharge it was 525. We have chosen not to restart your juluca due to the risk of contributing to QTc prolongation. Please follow up with your cardiologist within 1 week for further management. Please also follow up with Dr. Burgos within 1 week for HAART management. It is important to see your heart doctor, as you may be predisposed to heart rhythm irregularities if this value continues to be prolonged.    Diagnosis: History of HIV infection  Assessment and Plan of Treatment: We have restarted your medications and recommend following up with Dr. Burgos, an internal medicine and HIV specialist. Please follow up with her office within 1 week of discharge. She will perform routine bloodwork and ensure you are on the appropriate medications.    Diagnosis: Aortic root aneurysm  Assessment and Plan of Treatment: On arrival, your chest xray appeared wider than expected and this was concerning for progression of your known aortic root aneurysm. A bedside ultrasound was performed of your heart that showed no abnormality. A CT was performed of the chest with contrast and showed a stable 4.7cm aortic root aneurysm without any dissection and was negative for any clots. Please follow up with your [place] cardiologist. You have also been referred to Dr. Olson, a pulmonologist for further care. Dr. Olson can also address 2 benign nodules found in your lung that have been stable since 2016.    Diagnosis: Non-traumatic rhabdomyolysis  Assessment and Plan of Treatment: On arrival, your blood work showed an increase in creatine kinase, a marker for muscle irritation. This is likely due to your alcohol intake the night prior and possibly periods of being down in uncomfortable positioning that irritated your muscles. You have been treated with IV fluids and this value has decreased. Please ensure you take adequate oral fluid to ensure this continues to down trend. If you start to have any significant muscle pain, this could be an indicator of uptrending values that could warrant a return visit to the ER.

## 2020-11-12 NOTE — DISCHARGE NOTE PROVIDER - CARE PROVIDER_API CALL
Emily Burgos)  Internal Medicine  [address] [address]  Phone: [phone]  Fax: [phone]  Follow Up Time: 1 week    Shantal Olson)  Critical Care Medicine; Pulmonary Disease  [address], [address] [place] [id number]  Phone: [phone]  Fax: [phone]  Follow Up Time: 1 month    Zack Coto  UROLOGY  [address] [place] [address], [place] [address]  Phone: [phone]  Fax: [phone]  Follow Up Time: 2 weeks

## 2020-11-12 NOTE — DISCHARGE NOTE PROVIDER - NSDCFUADDAPPT_GEN_ALL_CORE_FT
Please follow up with your cardiologist and hematologist-oncologist at [place] within 2 weeks of discharge. Please take the results of your hospital imaging to your appointments so that they have the results on file.    Please follow up with Dr. Burgos's office within 1 week of discharge.     You were seen by Dr. Coto, urology, while in the hospital and can follow up with them within 2 weeks of discharge.    Some lung nodules were found that have been stable since 2016 and we recommend following with Dr. Olson, a pulmonologist within 1 month of discharge.

## 2020-11-12 NOTE — PROGRESS NOTE ADULT - SUBJECTIVE AND OBJECTIVE BOX
OVERNIGHT EVENTS:    SUBJECTIVE / INTERVAL HPI: Patient seen and examined at bedside.     VITAL SIGNS:  Vital Signs Last 24 Hrs  T(C): 36.9 (2020 05:58), Max: 36.9 (2020 08:06)  T(F): 98.5 (2020 05:58), Max: 98.5 (2020 08:06)  HR: 86 (2020 04:05) (86 - 137)  BP: 143/61 (2020 04:05) (143/61 - 170/70)  BP(mean): 88 (2020 04:05) (88 - 103)  RR: 18 (2020 04:05) (16 - 22)  SpO2: 96% (2020 04:05) (92% - 97%)    PHYSICAL EXAM:    General: Well developed, well nourished, no acute distress  HEENT: NC/AT; PERRL, anicteric sclera; MMM  Neck: supple  Cardiovascular: +S1/S2, RRR, no murmurs, rubs, gallops  Respiratory: CTA B/L; no W/R/R  Gastrointestinal: soft, NT/ND; +BSx4  Extremities: WWP; no edema, clubbing or cyanosis  Vascular: 2+ radial, DP/PT pulses B/L  Neurological: AAOx3; no focal deficits    MEDICATIONS:  MEDICATIONS  (STANDING):  atorvastatin 10 milliGRAM(s) Oral at bedtime  folic acid 1 milliGRAM(s) Oral daily  losartan 100 milliGRAM(s) Oral daily  metoprolol succinate ER 25 milliGRAM(s) Oral every 12 hours  multivitamin 1 Tablet(s) Oral daily  sodium chloride 0.9%. 1000 milliLiter(s) (125 mL/Hr) IV Continuous <Continuous>  thiamine 100 milliGRAM(s) Oral daily    MEDICATIONS  (PRN):  LORazepam   Injectable 1 milliGRAM(s) IV Push every 2 hours PRN Symptom-triggered: each CIWA -Ar score 8 or GREATER      ALLERGIES:  Allergies    IV contrast (Hives)  No Known Drug Allergies    Intolerances        LABS:                        15.5   8.06  )-----------( 136      ( 2020 17:26 )             45.2     11-11    136  |  101  |  7   ----------------------------<  128<H>  4.9   |  23  |  0.88    Ca    8.6      2020 22:22  Phos  2.6       Mg     2.2         TPro  6.7  /  Alb  3.7  /  TBili  1.5<H>  /  DBili  x   /  AST  149<H>  /  ALT  78<H>  /  AlkPhos  22<L>      PT/INR - ( 2020 17:26 )   PT: 12.9 sec;   INR: 1.08          PTT - ( 2020 17:26 )  PTT:31.1 sec  Urinalysis Basic - ( 2020 19:15 )    Color: Yellow / Appearance: Clear / S.020 / pH: x  Gluc: x / Ketone: 15 mg/dL  / Bili: Small / Urobili: 0.2 E.U./dL   Blood: x / Protein: NEGATIVE mg/dL / Nitrite: NEGATIVE   Leuk Esterase: NEGATIVE / RBC: < 5 /HPF / WBC < 5 /HPF   Sq Epi: x / Non Sq Epi: 0-5 /HPF / Bacteria: None /HPF      CAPILLARY BLOOD GLUCOSE      POCT Blood Glucose.: 130 mg/dL (2020 08:19)      RADIOLOGY & ADDITIONAL TESTS: Reviewed.    PLAN: OVERNIGHT EVENTS: Repeat ECG after Mg  QTc 543 to 516w/o ischemia changes. CT aortic root aneurysm stable in interval size 4.7cm since 2016. ECG QTc 514 after Mg 2.2. His SatO2 was fluctuated during night, he put on 2L NC. Early morning O2sat 97 in RA, off from NC    SUBJECTIVE / INTERVAL HPI: Patient seen and examined at bedside. He lying in his bed comfortable. Mental status improved today, more alert than yesterday. He does not have any complaint. He denies chest pain, SOB, dizziness n/v, f/c, pain.    VITAL SIGNS:  Vital Signs Last 24 Hrs  T(C): 36.9 (2020 05:58), Max: 36.9 (2020 08:06)  T(F): 98.5 (2020 05:58), Max: 98.5 (2020 08:06)  HR: 86 (2020 04:05) (86 - 137)  BP: 143/61 (2020 04:05) (143/61 - 170/70)  BP(mean): 88 (2020 04:05) (88 - 103)  RR: 18 (2020 04:05) (16 - 22)  SpO2: 96% (2020 04:05) (92% - 97%)    PHYSICAL EXAM:    General: Well developed, well nourished, no acute distress  HEENT: NC/AT; PERRL, anicteric sclera; MMM  Neck: supple  Cardiovascular: +S1/S2, RRR, no murmurs, rubs, gallops  Respiratory: CTA B/L; no W/R/R  Gastrointestinal: soft, NT/ND; +BSx4  Extremities: WWP; no edema, clubbing or cyanosis  Vascular: 2+ radial, DP/PT pulses B/L  Neurological: AAOx3; no focal deficits. Mild hand tremors    MEDICATIONS:  MEDICATIONS  (STANDING):  atorvastatin 10 milliGRAM(s) Oral at bedtime  folic acid 1 milliGRAM(s) Oral daily  losartan 100 milliGRAM(s) Oral daily  metoprolol succinate ER 25 milliGRAM(s) Oral every 12 hours  multivitamin 1 Tablet(s) Oral daily  sodium chloride 0.9%. 1000 milliLiter(s) (125 mL/Hr) IV Continuous <Continuous>  thiamine 100 milliGRAM(s) Oral daily    MEDICATIONS  (PRN):  LORazepam   Injectable 1 milliGRAM(s) IV Push every 2 hours PRN Symptom-triggered: each CIWA -Ar score 8 or GREATER      ALLERGIES:  Allergies    IV contrast (Hives)  No Known Drug Allergies    Intolerances        LABS:                        15.5   8.06  )-----------( 136      ( 2020 17:26 )             45.2         136  |  101  |  7   ----------------------------<  128<H>  4.9   |  23  |  0.88    Ca    8.6      2020 22:22  Phos  2.6       Mg     2.2         TPro  6.7  /  Alb  3.7  /  TBili  1.5<H>  /  DBili  x   /  AST  149<H>  /  ALT  78<H>  /  AlkPhos  22<L>      PT/INR - ( 2020 17:26 )   PT: 12.9 sec;   INR: 1.08          PTT - ( 2020 17:26 )  PTT:31.1 sec  Urinalysis Basic - ( 2020 19:15 )    Color: Yellow / Appearance: Clear / S.020 / pH: x  Gluc: x / Ketone: 15 mg/dL  / Bili: Small / Urobili: 0.2 E.U./dL   Blood: x / Protein: NEGATIVE mg/dL / Nitrite: NEGATIVE   Leuk Esterase: NEGATIVE / RBC: < 5 /HPF / WBC < 5 /HPF   Sq Epi: x / Non Sq Epi: 0-5 /HPF / Bacteria: None /HPF      CAPILLARY BLOOD GLUCOSE      POCT Blood Glucose.: 130 mg/dL (2020 08:19)      RADIOLOGY & ADDITIONAL TESTS: Reviewed.    PLAN:

## 2020-11-12 NOTE — DISCHARGE NOTE PROVIDER - NSDCMRMEDTOKEN_GEN_ALL_CORE_FT
Ambien: 12.5 milligram(s) orally once a day (at bedtime)  Edurant 25 mg oral tablet: 1 tab(s) orally once a day  Eliquis 5 mg oral tablet: 1 tab(s) orally 2 times a day  losartan 100 mg oral tablet: 1 tab(s) orally once a day  metoprolol succinate 25 mg oral tablet, extended release: 1 tab(s) orally 2 times a day  pravastatin 40 mg oral tablet: 1 tab(s) orally once a day  testosterone cypionate 200 mg/mL intramuscular solution: 1 milliliter(s) intramuscular every 2 weeks  Tivicay 50 mg oral tablet: 1 tab(s) orally once a day   Ambien: 12.5 milligram(s) orally once a day (at bedtime)  Eliquis 5 mg oral tablet: 1 tab(s) orally 2 times a day  folic acid 1 mg oral tablet: 1 tab(s) orally once a day  losartan 100 mg oral tablet: 1 tab(s) orally once a day  metoprolol succinate 25 mg oral tablet, extended release: 1 tab(s) orally 2 times a day  Multiple Vitamins oral tablet: 1 tab(s) orally once a day  pravastatin 40 mg oral tablet: 1 tab(s) orally once a day  testosterone cypionate 200 mg/mL intramuscular solution: 1 milliliter(s) intramuscular every 2 weeks  thiamine 100 mg oral tablet: 1 tab(s) orally once a day

## 2020-11-12 NOTE — PROGRESS NOTE ADULT - PROBLEM SELECTOR PLAN 8
- pt with known hx prothrombin gene mutation and polycythemia vera, as above  - non-compliant with eliquis and anti-HTN  - per pt, hx TIA 2019  - neuro exam benign  - CTH neg acute infarct/hemorrhage  - c/w therapeutic exchange for pravastatin 40mg -- atorvastatin 10mg  - c/w anti-HTN as above  - restart eliquis as appropriate

## 2020-11-12 NOTE — CHART NOTE - NSCHARTNOTEFT_GEN_A_CORE
HIV Chart Review Note    CC:  Patient is a 59y old  Male who presents with a chief complaint of AMS (2020 08:03)    Admission H&P, Progress Notes and Consultant Notes reviewed by me in detail.    Outpatient HIV Provider: Provider in [place]  Year of HIV Diagnosis:   T cell jose de jesus:   Highest Viral Load: UK  Current ARV regimen: DTG/Rilpivirine  ARV adherence: sub-optimal  Previous ARV regimens: Biktarvy, descovy,   Hx of Past Opportunistic Infections:     PAST MEDICAL & SURGICAL HISTORY:  Prothrombin gene mutation  Pulmonary emboli  DVT (deep venous thrombosis)  HIV (human immunodeficiency virus infection)    No significant past surgical history    FHx: Non-contributory    SHx-  High risk MSM  Drug use    Home Meds:  MEDICATIONS  (STANDING):  apixaban 5 milliGRAM(s) Oral every 12 hours  atorvastatin 10 milliGRAM(s) Oral at bedtime  folic acid 1 milliGRAM(s) Oral daily  losartan 100 milliGRAM(s) Oral daily  metoprolol succinate ER 25 milliGRAM(s) Oral every 12 hours  multivitamin 1 Tablet(s) Oral daily  thiamine 100 milliGRAM(s) Oral daily    Allergies  IV contrast (Hives)    LABS:                         15.6   9.05  )-----------( 143      ( 2020 06:21 )             45.3     -12    133<L>  |  100  |  8   ----------------------------<  124<H>  4.8   |  20<L>  |  0.96    Ca    8.2<L>      2020 06:21  Phos  2.7     11-12  Mg     2.1     -12    TPro  6.5  /  Alb  3.5  /  TBili  0.9  /  DBili  x   /  AST  127<H>  /  ALT  75<H>  /  AlkPhos  22<L>  11-12    PT/INR - ( 2020 17:26 )   PT: 12.9 sec;   INR: 1.08          PTT - ( 2020 17:26 )  PTT:31.1 sec  Urinalysis Basic - ( 2020 19:15 )    Color: Yellow / Appearance: Clear / S.020 / pH: x  Gluc: x / Ketone: 15 mg/dL  / Bili: Small / Urobili: 0.2 E.U./dL   Blood: x / Protein: NEGATIVE mg/dL / Nitrite: NEGATIVE   Leuk Esterase: NEGATIVE / RBC: < 5 /HPF / WBC < 5 /HPF   Sq Epi: x / Non Sq Epi: 0-5 /HPF / Bacteria: None /HPF    Microbiology/Cultures: Reviewed    Images/EKG/etc: Reviewed    Assessment/Recs  Recommendations based on chart review.  Patient apparently is eager to leave and I will not be able to see patient prior to discharge.  If patient is still admitted, I will see/examine patient and update note accordingly.    Patient gets HIV care from provider in [place].  He goes 2x/year but is unable to go now due to COVID.  Wishes to establish care locally.  Per med rec, has been on biktarvy, descovy/tivicay, and now Juluca.  Patient has prolonged QTc (>500) which rilipivirine can worsen.  No evidence of OI or AIDs in labs/notes.  No apparent contraindication to Biktarvy.  Unknown genotype.    - Can transition patient to Biktarvy and discharge for follow up w/ [facility] (patient can see me on M/W at [facility] ([phone])).   Plan will be to repeat EKG as outpatient to re-assess medications.

## 2020-11-12 NOTE — PROGRESS NOTE ADULT - PROBLEM SELECTOR PLAN 7
- pt admits to testosterone injections 200mg 1mL q2 wks and trimix penile injections to aid in "firmer" erections   - on exam, penis flaccid, erythematous, and swollen though non-tender with injection site visible and non-infectious in appearance  - pt denies pain or urinary symptoms  - f/u UA, UCx  - urology consulted, no surgical intervention for now and f/u with urology in 2weeks

## 2020-11-12 NOTE — DISCHARGE NOTE PROVIDER - PROVIDER TOKENS
PROVIDER:[TOKEN:[[id number]],FOLLOWUP:[1 week]],PROVIDER:[TOKEN:[[id number]],FOLLOWUP:[1 month]],PROVIDER:[TOKEN:[[id number]],FOLLOWUP:[2 weeks]]

## 2020-11-12 NOTE — PROGRESS NOTE ADULT - PROBLEM SELECTOR PLAN 3
- pt with known hx prothrombin gene mutation after forming BL PE 2016  - previously on eliquis, transitioned to lovenox, now back on eliquis  - has been out of medication for a "few" weeks  - hold reinitiation given c/f widened mediastinum as below  - restart eliquis 5mg BID as appropriate    #Polycythemia vera  - pt with known hx PV, dx 2016 after PE as above  - follows with heme-onc @ [place] and advised to have intermittent therapeutic phlebotomy but has not "in a while"  - on arrival, Hb 16.4 with Hct 47.6 -- also likely volume down at the time  - continue to monitor H/H  - consider AM heme-onc consult    #Prolonged QTc  - on arrival, EKG with QTc >500  - s/p haldol in ED  - will replete Mg as above and repeat EKG  - cautious with QT prolonging agents - avoid librium and haldol especially with c/f alcohol withdrawal

## 2020-11-12 NOTE — PROGRESS NOTE ADULT - PROBLEM SELECTOR PLAN 1
- pt found wandering without clothes and confused at hotel with dilated pupils, lethargy, and slurred speech by EMS after drinking heavily the evening prior  - pt admits to intermittent alcohol use 3-4x/wk without any history of withdrawal  - per chart notes, CIWA 5 on arrival to [facility] with mild agitation and confusion requiring 1:1 obs and resolving after ativan 2mg x2, midazolam 5mg, haldol 5mg  - serum alcohol neg, UDS +amphetamines   - on exam, AOx3 and more alert though appears fatigued   - CTH neg  - initial AMS likely in the setting of alcohol and substance abuse   - also consider TIA, dehydration, infectious etiology  -Mental status improved today, more alert than yesterday  - CIWA 1 mild hand tremor  - continue to monitor mental status

## 2020-11-12 NOTE — PROGRESS NOTE ADULT - PROBLEM SELECTOR PLAN 2
- on arrival, CK 5101 trended down to 3029  - likely in the setting of recent alcohol/substance abuse with pt admitting to periods of memory loss c/f possible periods of being down  - EKG on arrival with sinus tach and RBBB   - on exam, no muscle tenderness   - s/p 2L NS bolus in ED, continued on mIVF 125cc/h       #Hypomagnesemia  - Mg 1.3 on arrival, s/p 2g IV Mg in ED  - likely 2/2 poor nutritional intake and alcohol use  - will dose 2x 2mg Mg and repeat Mg level 8p  - continue to monitor and replete PRN    #Hypokalemia  - on arrival, K 3.3  - likely 2/2 poor nutritional intake and alcohol use  - will dose 2x 40mg K PO  - repeat BMP 8p - replete PRN

## 2020-11-12 NOTE — PROGRESS NOTE ADULT - PROBLEM SELECTOR PLAN 10
- F: s/p 2L NS bolus and c/w mIVF 125 cc/h  - E: replete K<4, Mg<2  - N: DASH/TLC  - D: hold in setting of c/f widened mediastinum, restart eliquis 5mg BID as appropriate  - G: none    Code: full  Dispo: 7L

## 2020-11-12 NOTE — PROGRESS NOTE ADULT - PROBLEM SELECTOR PLAN 4
- 2016 CT chest c/w incidental 4.6cm aortic root aneurysm   - per pt, last checked 9/2020 and "stable"  - 2019 TTE with EF 65%   - CXR on arrival c/f widened mediastinum compared to 2016   - pt tachycardic on arrival and mildly hypertensive  - Bedside echo showed mild Aortic insufficieny & mildly dilated aortic root  - official TTE pending, f/u results  - CTA chest w IV contrast showed aortic root aneurysm stable in interval size 4.7cm since 2016 study  - currently HDS and clinically comfortable on exam  - continue to monitor and consider vascular consult as needed    #HTN (hypertension)  - pt with known hx HTN, on toprol 25mg BID and losartan 100mg daily outpt  - c/w home meds

## 2020-11-13 LAB
CULTURE RESULTS: SIGNIFICANT CHANGE UP
SPECIMEN SOURCE: SIGNIFICANT CHANGE UP

## 2020-11-18 DIAGNOSIS — N52.9 MALE ERECTILE DYSFUNCTION, UNSPECIFIED: ICD-10-CM

## 2020-11-18 DIAGNOSIS — Z91.041 RADIOGRAPHIC DYE ALLERGY STATUS: ICD-10-CM

## 2020-11-18 DIAGNOSIS — F10.10 ALCOHOL ABUSE, UNCOMPLICATED: ICD-10-CM

## 2020-11-18 DIAGNOSIS — Z21 ASYMPTOMATIC HUMAN IMMUNODEFICIENCY VIRUS [HIV] INFECTION STATUS: ICD-10-CM

## 2020-11-18 DIAGNOSIS — R41.82 ALTERED MENTAL STATUS, UNSPECIFIED: ICD-10-CM

## 2020-11-18 DIAGNOSIS — F15.10 OTHER STIMULANT ABUSE, UNCOMPLICATED: ICD-10-CM

## 2020-11-18 DIAGNOSIS — G93.41 METABOLIC ENCEPHALOPATHY: ICD-10-CM

## 2020-11-18 DIAGNOSIS — R74.9 ABNORMAL SERUM ENZYME LEVEL, UNSPECIFIED: ICD-10-CM

## 2020-11-18 DIAGNOSIS — D45 POLYCYTHEMIA VERA: ICD-10-CM

## 2020-11-18 DIAGNOSIS — D68.52 PROTHROMBIN GENE MUTATION: ICD-10-CM

## 2020-11-18 DIAGNOSIS — E83.42 HYPOMAGNESEMIA: ICD-10-CM

## 2020-11-18 DIAGNOSIS — M62.82 RHABDOMYOLYSIS: ICD-10-CM

## 2020-11-18 DIAGNOSIS — Z86.711 PERSONAL HISTORY OF PULMONARY EMBOLISM: ICD-10-CM

## 2020-11-18 DIAGNOSIS — E87.6 HYPOKALEMIA: ICD-10-CM

## 2020-11-18 DIAGNOSIS — Z79.01 LONG TERM (CURRENT) USE OF ANTICOAGULANTS: ICD-10-CM

## 2020-11-18 DIAGNOSIS — Z86.718 PERSONAL HISTORY OF OTHER VENOUS THROMBOSIS AND EMBOLISM: ICD-10-CM

## 2020-11-18 DIAGNOSIS — R94.31 ABNORMAL ELECTROCARDIOGRAM [ECG] [EKG]: ICD-10-CM

## 2020-11-18 DIAGNOSIS — I71.4 ABDOMINAL AORTIC ANEURYSM, WITHOUT RUPTURE: ICD-10-CM

## 2020-11-18 DIAGNOSIS — I27.20 PULMONARY HYPERTENSION, UNSPECIFIED: ICD-10-CM

## 2020-11-18 DIAGNOSIS — I10 ESSENTIAL (PRIMARY) HYPERTENSION: ICD-10-CM

## 2021-02-05 NOTE — ED ADULT NURSE NOTE - PAIN: PRESENCE, MLM
45 yo male with etoh intox, (+) L sided facial trauma, unknown LOC, BIBE<S after being found on the street, smelling of etoh. On arrival uncooperative with HPI.
complains of pain/discomfort/sensitive to touch right calf

## 2022-01-01 NOTE — DISCHARGE NOTE PROVIDER - NSDCCPGOAL_GEN_ALL_CORE_FT
Health Maintenance Due   Topic Date Due   • Well Child Exam by 1 Month  2022       Patient is up to date, no discussion needed.   To get better and follow your care plan as instructed.

## 2022-05-23 NOTE — ED PROVIDER NOTE - PMH
DVT (deep venous thrombosis)    HIV (human immunodeficiency virus infection)    Prothrombin gene mutation    Pulmonary emboli    
within normal limits

## 2022-10-03 NOTE — ED PROVIDER NOTE - HISTORY ATTESTATION, MLM
PAST MEDICAL HISTORY:  Gastritis     History of hypertension     Hypercholesterolemia     Osteopenia      I have reviewed and confirmed nurses' notes...

## 2023-04-25 NOTE — ED ADULT TRIAGE NOTE - PAIN: PRESENCE, MLM
Received call from patient requesting script for 10's machine. Pt stated he might be eligible for one thru insurance. Please advise.      Call back#: [phone] complains of pain/discomfort/RLE

## 2023-09-20 NOTE — ED ADULT NURSE NOTE - EXPLANATION OF PATIENT'S REASON FOR LEAVING
negative - no change in level of consciousness patient prefers to be seen tomorrow because he has plans tonight

## 2023-10-15 NOTE — ED ADULT NURSE NOTE - CAS EDN DISCHARGE INTERVENTIONS
PLEASE RETURN TO THE EMERGENCY DEPARTMENT IMMEDIATELY if your symptoms worsen in anyway or in 1-2 days if not improved for re-evaluation. You should immediately return to the ER for symptoms such as new or worsening pain, difficulty breathing or swallowing, a change in your voice, a feeling of passing out, light headed, dizziness, chest pain, headache, neck pain, rash, abdominal pain or vomiting. Take your medication as indicated and prescribed. If you are given an antibiotic then, make sure you get the prescription filled and take the antibiotics until finished. Please understand that at this time there is no evidence for a more serious underlying process, but that early in the process of an illness or injury, an emergency department workup can be falsely reassuring. You should contact your family doctor within the next 48 hours for a follow up appointment. [address]!!!    From [facility] ([facility]) and [address] [place] [address] Emergency Services    On behalf of the Emergency Department staff at [facility]), I would like to thank you for giving us the opportunity to address your health care needs and concerns. We hope that during your visit, our service was delivered in a professional and caring manner. Please keep [facility]) in mind as we walk with you down the path to your own personal wellness. Please expect an automated text message or email from us so we can ask a few questions about your health and progress. Based on your answers, a clinician may call you back to offer help and instructions. Please understand that early in the process of an illness or injury, an emergency department workup can be falsely reassuring. If you notice any worsening, changing or persistent symptoms please call your family doctor or return to the ER immediately. Tell us how we did during your visit at [email or web address]   and let us know about your experience IV intact IV intact/Arm band on

## 2024-03-13 NOTE — ED ADULT TRIAGE NOTE - MODE OF ARRIVAL
4/3 at 11:20am in the EP Clinic ( 4th floor Oncology building [facility] [address], [address], [place], [place], [address] [phone] )    endocrinologist within 1 week.     Cardiologist in [place] in 1 week
Walk in
